# Patient Record
Sex: FEMALE | Race: WHITE | Employment: OTHER | URBAN - METROPOLITAN AREA
[De-identification: names, ages, dates, MRNs, and addresses within clinical notes are randomized per-mention and may not be internally consistent; named-entity substitution may affect disease eponyms.]

---

## 2018-08-08 DIAGNOSIS — O00.80 PREGNANCY, ECTOPIC, CORNUAL OR CERVICAL: Primary | ICD-10-CM

## 2018-08-09 ENCOUNTER — TELEPHONE (OUTPATIENT)
Dept: MATERNAL FETAL MEDICINE | Facility: CLINIC | Age: 38
End: 2018-08-09

## 2018-08-09 ENCOUNTER — PRE VISIT (OUTPATIENT)
Dept: MATERNAL FETAL MEDICINE | Facility: CLINIC | Age: 38
End: 2018-08-09

## 2018-08-09 NOTE — TELEPHONE ENCOUNTER
2nd attempt to reach patient with use of , regarding ultrasound appt tomorrow 8/10/18 at 0930am.  Patient instructed to be NPO, nothing to eat or drink after 3:30am.    Luzma Quan RN

## 2018-08-10 ENCOUNTER — ANESTHESIA EVENT (OUTPATIENT)
Dept: SURGERY | Facility: CLINIC | Age: 38
End: 2018-08-10
Payer: MEDICAID

## 2018-08-10 ENCOUNTER — OFFICE VISIT (OUTPATIENT)
Dept: MATERNAL FETAL MEDICINE | Facility: CLINIC | Age: 38
End: 2018-08-10
Payer: MEDICAID

## 2018-08-10 ENCOUNTER — OFFICE VISIT (OUTPATIENT)
Dept: INTERPRETER SERVICES | Facility: CLINIC | Age: 38
End: 2018-08-10
Payer: MEDICAID

## 2018-08-10 ENCOUNTER — HOSPITAL ENCOUNTER (OUTPATIENT)
Facility: CLINIC | Age: 38
Discharge: HOME | End: 2018-08-10
Attending: OBSTETRICS & GYNECOLOGY | Admitting: OBSTETRICS & GYNECOLOGY
Payer: MEDICAID

## 2018-08-10 ENCOUNTER — HOSPITAL ENCOUNTER (OUTPATIENT)
Dept: ULTRASOUND IMAGING | Facility: CLINIC | Age: 38
End: 2018-08-10
Payer: MEDICAID

## 2018-08-10 ENCOUNTER — ANESTHESIA (OUTPATIENT)
Dept: SURGERY | Facility: CLINIC | Age: 38
End: 2018-08-10
Payer: MEDICAID

## 2018-08-10 DIAGNOSIS — O00.80 OTHER ECTOPIC PREGNANCY WITHOUT INTRAUTERINE PREGNANCY: Primary | ICD-10-CM

## 2018-08-10 DIAGNOSIS — O00.81 OTHER ECTOPIC PREGNANCY WITH INTRAUTERINE PREGNANCY: Primary | ICD-10-CM

## 2018-08-10 DIAGNOSIS — O00.80 PREGNANCY, ECTOPIC, CORNUAL OR CERVICAL: ICD-10-CM

## 2018-08-10 PROBLEM — O00.90 ECTOPIC PREGNANCY: Status: ACTIVE | Noted: 2018-08-10

## 2018-08-10 LAB
ABO + RH BLD: NORMAL
ABO + RH BLD: NORMAL
BASOPHILS # BLD AUTO: 0 10E9/L (ref 0–0.2)
BASOPHILS NFR BLD AUTO: 0.1 %
BLD GP AB SCN SERPL QL: NORMAL
BLOOD BANK CMNT PATIENT-IMP: NORMAL
DIFFERENTIAL METHOD BLD: ABNORMAL
EOSINOPHIL # BLD AUTO: 0.1 10E9/L (ref 0–0.7)
EOSINOPHIL NFR BLD AUTO: 1 %
ERYTHROCYTE [DISTWIDTH] IN BLOOD BY AUTOMATED COUNT: 23.9 % (ref 10–15)
GLUCOSE BLDC GLUCOMTR-MCNC: 93 MG/DL (ref 70–99)
HCT VFR BLD AUTO: 36.7 % (ref 35–47)
HGB BLD-MCNC: 11.3 G/DL (ref 11.7–15.7)
IMM GRANULOCYTES # BLD: 0 10E9/L (ref 0–0.4)
IMM GRANULOCYTES NFR BLD: 0.1 %
LYMPHOCYTES # BLD AUTO: 2 10E9/L (ref 0.8–5.3)
LYMPHOCYTES NFR BLD AUTO: 30.3 %
MCH RBC QN AUTO: 22.6 PG (ref 26.5–33)
MCHC RBC AUTO-ENTMCNC: 30.8 G/DL (ref 31.5–36.5)
MCV RBC AUTO: 73 FL (ref 78–100)
MONOCYTES # BLD AUTO: 0.3 10E9/L (ref 0–1.3)
MONOCYTES NFR BLD AUTO: 4.3 %
NEUTROPHILS # BLD AUTO: 4.3 10E9/L (ref 1.6–8.3)
NEUTROPHILS NFR BLD AUTO: 64.2 %
NRBC # BLD AUTO: 0 10*3/UL
NRBC BLD AUTO-RTO: 0 /100
PLATELET # BLD AUTO: 207 10E9/L (ref 150–450)
RBC # BLD AUTO: 5.01 10E12/L (ref 3.8–5.2)
SPECIMEN EXP DATE BLD: NORMAL
WBC # BLD AUTO: 6.7 10E9/L (ref 4–11)

## 2018-08-10 PROCEDURE — 40000883 ZZH CANCELLED SURGERY UP TO 61-90 MINS: Performed by: OBSTETRICS & GYNECOLOGY

## 2018-08-10 PROCEDURE — 86850 RBC ANTIBODY SCREEN: CPT | Performed by: OBSTETRICS & GYNECOLOGY

## 2018-08-10 PROCEDURE — 90715 TDAP VACCINE 7 YRS/> IM: CPT | Mod: ZF

## 2018-08-10 PROCEDURE — 25000128 H RX IP 250 OP 636: Mod: ZF

## 2018-08-10 PROCEDURE — 85025 COMPLETE CBC W/AUTO DIFF WBC: CPT | Performed by: OBSTETRICS & GYNECOLOGY

## 2018-08-10 PROCEDURE — 86900 BLOOD TYPING SEROLOGIC ABO: CPT | Performed by: OBSTETRICS & GYNECOLOGY

## 2018-08-10 PROCEDURE — 36415 COLL VENOUS BLD VENIPUNCTURE: CPT | Performed by: OBSTETRICS & GYNECOLOGY

## 2018-08-10 PROCEDURE — 86901 BLOOD TYPING SEROLOGIC RH(D): CPT | Performed by: OBSTETRICS & GYNECOLOGY

## 2018-08-10 PROCEDURE — 76801 OB US < 14 WKS SINGLE FETUS: CPT

## 2018-08-10 PROCEDURE — 40000068 ZZH STATISTIC EVAL-PTS ADMITTED TO OTHER DEPTS: Mod: ZF

## 2018-08-10 PROCEDURE — T1013 SIGN LANG/ORAL INTERPRETER: HCPCS | Mod: U3,ZF

## 2018-08-10 PROCEDURE — 82962 GLUCOSE BLOOD TEST: CPT

## 2018-08-10 RX ORDER — PNV NO.95/FERROUS FUM/FOLIC AC 28MG-0.8MG
325 TABLET ORAL
COMMUNITY
Start: 2018-07-19 | End: 2018-09-14

## 2018-08-10 RX ORDER — SODIUM CHLORIDE, SODIUM LACTATE, POTASSIUM CHLORIDE, CALCIUM CHLORIDE 600; 310; 30; 20 MG/100ML; MG/100ML; MG/100ML; MG/100ML
INJECTION, SOLUTION INTRAVENOUS CONTINUOUS
Status: DISCONTINUED | OUTPATIENT
Start: 2018-08-10 | End: 2018-08-10 | Stop reason: HOSPADM

## 2018-08-10 RX ORDER — ONDANSETRON 2 MG/ML
4 INJECTION INTRAMUSCULAR; INTRAVENOUS EVERY 30 MIN PRN
Status: CANCELLED | OUTPATIENT
Start: 2018-08-10

## 2018-08-10 RX ORDER — SODIUM CHLORIDE, SODIUM LACTATE, POTASSIUM CHLORIDE, CALCIUM CHLORIDE 600; 310; 30; 20 MG/100ML; MG/100ML; MG/100ML; MG/100ML
INJECTION, SOLUTION INTRAVENOUS CONTINUOUS
Status: CANCELLED | OUTPATIENT
Start: 2018-08-10

## 2018-08-10 RX ORDER — HYDROMORPHONE HYDROCHLORIDE 1 MG/ML
.3-.5 INJECTION, SOLUTION INTRAMUSCULAR; INTRAVENOUS; SUBCUTANEOUS EVERY 10 MIN PRN
Status: CANCELLED | OUTPATIENT
Start: 2018-08-10

## 2018-08-10 RX ORDER — DIPHENHYDRAMINE HYDROCHLORIDE 25 MG/1
CAPSULE, LIQUID FILLED ORAL
COMMUNITY
Start: 2018-07-19

## 2018-08-10 RX ORDER — ONDANSETRON 4 MG/1
4 TABLET, ORALLY DISINTEGRATING ORAL EVERY 30 MIN PRN
Status: CANCELLED | OUTPATIENT
Start: 2018-08-10

## 2018-08-10 RX ORDER — FENTANYL CITRATE 50 UG/ML
25-50 INJECTION, SOLUTION INTRAMUSCULAR; INTRAVENOUS
Status: CANCELLED | OUTPATIENT
Start: 2018-08-10

## 2018-08-10 RX ORDER — METFORMIN HCL 500 MG
1000 TABLET, EXTENDED RELEASE 24 HR ORAL
COMMUNITY
Start: 2018-01-30

## 2018-08-10 RX ORDER — MEPERIDINE HYDROCHLORIDE 50 MG/ML
12.5 INJECTION INTRAMUSCULAR; INTRAVENOUS; SUBCUTANEOUS
Status: CANCELLED | OUTPATIENT
Start: 2018-08-10

## 2018-08-10 RX ORDER — NALOXONE HYDROCHLORIDE 0.4 MG/ML
.1-.4 INJECTION, SOLUTION INTRAMUSCULAR; INTRAVENOUS; SUBCUTANEOUS
Status: CANCELLED | OUTPATIENT
Start: 2018-08-10 | End: 2018-08-11

## 2018-08-10 RX ORDER — LIDOCAINE 40 MG/G
CREAM TOPICAL
Status: DISCONTINUED | OUTPATIENT
Start: 2018-08-10 | End: 2018-08-10 | Stop reason: HOSPADM

## 2018-08-10 NOTE — NURSING NOTE
Pt at [facility] today for ultrasound due to suspected c/s scar ectopic pregnancy.  Tv ultrasound performed.  Dr. Newell reviewed results with pt and partner and cardioplegia procedure attempted but was not successful.  MD arranged for pt to go to OR for procedure under anesthesia at 1500 today.   See separate MD documentation.  Pt discharged from clinic at 1305 and  will accompany pt to 3rd floor adult pre-op.  Pt is RH positive.

## 2018-08-10 NOTE — PROGRESS NOTES
Bedside transvaginal Ultrasound performed with Dr. Newell.     No embryonic cardiac activity seen with color flow with US.     Due to the lack of cardiac activity on ultrasound. Cardioplegia is no longer need.     Plan:   Trasvaginal injection for cardioplegia cancelled  Patient to be discharged and follow up in [facility] clinic on Monday for methotrexate treatment.   Discussed with patient discharge instructions.     Pedro Yadav MD  [facility] fellow      Physician Attestation   I, Romulo Newell, saw and evaluated Joann Gann with the resident.      I have reviewed and discussed with Dr. Méndez the patient history, physical exam and plan of care. I personally reviewed the vital signs, medications, lab results and imaging.    Key history or physical exam findings: no FCA on ultrasound    Key management decisions made: cancelled procedure under sedation.    Romulo Newell  Date of Service (when I saw the patient): August 10, 2018  Time Spent on this Encounter   I, Romulo Newell, spent a total of 10 minutes in face to face consultation today managing the care of Joann Gann.  Over 50% of my time on the unit was spent counseling the patient and /or coordinating care regarding medical management to follow. See note for details.

## 2018-08-10 NOTE — IP AVS SNAPSHOT
MRN:[id number]                      After Visit Summary   8/10/2018    Joann Gnan    MRN: [id number]           Thank you!     Thank you for choosing [facility] for your care. Our goal is always to provide you with excellent care. Hearing back from our patients is one way we can continue to improve our services. Please take a few minutes to complete the written survey that you may receive in the mail after you visit with us. Thank you!        Patient Information     Date Of Birth          1980        About your hospital stay     You were admitted on:  August 10, 2018 You last received care in the:  [facility] PREOP/PH[facility] II    You were discharged on:  August 10, 2018        Reason for your hospital stay       Admitted for possible transvaginal injection for cardioplegia                  Who to Call     For medical emergencies, please call [phone].  For non-urgent questions about your medical care, please call your primary care provider or clinic, [phone]  For questions related to your surgery, please call your surgery clinic        Attending Provider     Provider Specialty    Romulo Newell MD Obstetrics & Gynecology, Maternal & Fetal Medicine       Primary Care Provider Office Phone # Fax #    [facility] [phone]       When to contact your care team       Notify if you develop severe pain, heavy vaginal bleeding, fever, chills, intractable nausea or vomiting                  After Care Instructions     Activity       Your activity upon discharge: Normal activity. Nothing in the vagina.            Diet       Follow this diet upon discharge:General regular                  Follow-up Appointments     Adult [facility] Follow-up and recommended labs and tests       Office will call for appointment on Monday     Appointments on [place] and/or [facility] (with [facility] or [facility] provider or service). Call [phone] if you haven't heard regarding these  "appointments by Monday                  Further instructions from your care team       Call if bleeding more than pad an hour cramping   Dr Romulo Newell office  [phone]          Pending Results     Date and Time Order Name Status Description    8/10/2018 1441 CBC with platelets differential In process             Admission Information     Date & Time Provider Department Dept. Phone    8/10/2018 Romulo Newell MD [facility] PREOP/PHASE [phone]      Your Vitals Were     Last Period                   2018 (Approximate)           MyChart Information     [facility] lets you send messages to your doctor, view your test results, renew your prescriptions, schedule appointments and more. To sign up, go to [email or web address] . Click on \"Log in\" on the left side of the screen, which will take you to the Welcome page. Then click on \"Sign up Now\" on the right side of the page.     You will be asked to enter the access code listed below, as well as some personal information. Please follow the directions to create your username and password.     Your access code is: [email or web address]  Expires: 2018  4:18 PM     Your access code will  in 90 days. If you need help or a new code, please call your [place] clinic or [phone].        Care EveryWhere ID     This is your Care EveryWhere ID. This could be used by other organizations to access your [place] medical records  [id number]        Equal Access to Services     [facility] : Hadii dania joseph hadeuniceo Soismaali, waaxda luqadaha, qaybta kaalmada adeegyada, hayden bailey . So [facility] [phone].    ATENCIÓN: Si habla español, tiene a taylor disposición servicios gratuitos de asistencia lingüística. Llame al [phone].    We comply with applicable federal civil rights laws and [place] laws. We do not discriminate on the basis of race, color, national origin, age, disability, sex, sexual orientation, or gender identity.             "   Review of your medicines      CONTINUE these medicines which have NOT CHANGED        Dose / Directions    Blood Glucose Monitor System w/Device Kit        Patient not on insulin. Frequency of testin/day. . Dispense test strips and lancets per pharmacist discretion.   Refills:  0       INSULIN ADMIN SUPPLIES        Please dispense a glucose meter:  Per pharmacist discretion   Refills:  0       iron 325 (65 Fe) MG tablet        Dose:  325 mg   Take 325 mg by mouth   Refills:  0       metFORMIN 500 MG 24 hr tablet   Commonly known as:  GLUCOPHAGE-XR        Dose:  1000 mg   Take 1,000 mg by mouth   Refills:  0         STOP taking     Prenatal Vitamins 0.8 MG Tabs                    Protect others around you: Learn how to safely use, store and throw away your medicines at www.disposemymeds.org.             Medication List: This is a list of all your medications and when to take them. Check marks below indicate your daily home schedule. Keep this list as a reference.      Medications           Morning Afternoon Evening Bedtime As Needed    Blood Glucose Monitor System w/Device Kit   Patient not on insulin. Frequency of testin/day. . Dispense test strips and lancets per pharmacist discretion.                                INSULIN ADMIN SUPPLIES   Please dispense a glucose meter:  Per pharmacist discretion                                iron 325 (65 Fe) MG tablet   Take 325 mg by mouth                                metFORMIN 500 MG 24 hr tablet   Commonly known as:  GLUCOPHAGE-XR   Take 1,000 mg by mouth

## 2018-08-10 NOTE — PROGRESS NOTES
Maternal-Fetal Medicine Consultation    Joann Gann  : 1980  MRN: [id number]    REFERRAL:  Joann Gann is a 37 year old sent by Dr. Saucedo for evaluation of c/section scar ectopic.    HPI:  Joann Gann is a 37 year old  at 8w6d by 7w2d US here for M consultation for concerns regarding c/section scar ectopic pregnancy.    She is here with her partner, FOB    She has a history of type 2 diabetes and she is morbidly obese.  She reports that she experienced vaginal bleeding early with this pregnancy.  She had an ultrasound on 2018 where a single gestational sac appeared to be implanted near the cervix.  She was referred for further assessment to [facility] on 2018.  A repeat ultrasound was consistent with a possible  scar pregnancy.  There was a azar intrauterine pregnancy measuring at 7 weeks and 6 days with cardiac activity.  With BRIANNE is on 2019.      An MRI was performed on 2018 and it confirmed a single intrauterine pregnancy implanted in the lower uterine segment on the  scar.    She denies any current vaginal bleeding, cramping, abnormal vaginal discharge.      Dating:    LMP: 2018   Dating ultrasound: 2018     Assigned EDC: 3/16/19  by 7W2D     Obstetrics History:  Patient has a history of 3 prior  sections.      Last  was on 2012 review of her operative report there was note of dense abdominal adhesions with dense fascial scarring.  Normal-appearing ovaries fallopian tubes and  uterus.      Past Medical History:  -Type 2 diabetes  -Morbid obesity BMI of 46        Past Surgical History:  3 prior C-sections    Current Medications:  Prenatal vitamins  Prior to Admission medications    Not on File       Allergies:  Review of patient's allergies indicates no known allergies.    Social History:   She has a partner who is a father of this baby.  She denies smoking.   Denies use of alcohol or drugs.      Family History:  Denies history of genetic disorders, preeeclampsia, thromboembolic disease, bleeding disorders, mental retardation      ROS:  10-point ROS negative except as in HPI     PHYSICAL EXAM:  LMP 2018 (Approximate)    Gen: NAD, well appearing  Chest: Non-labored breathing  Abdomen: Soft, obese. NT/ND.     Other Imaging:   ----------------------------------------------------------------------   OBSTETRICS REPORT                         (Signed Final 2018 15:36)  ----------------------------------------------------------------------  PATIENT INFO:   ID #:       [id number]                            :  [birth year] (37 yrs)(F)   Name:       NOEMY HOANG            Visit Date: 2018 08:53  ----------------------------------------------------------------------  PERFORMED BY:   Performed By:   Rosaura Chris          Referred By:    [facility]                                    Center  ----------------------------------------------------------------------    #  Ordered By               Order #        Accession #    Episode #    1  AUGUSTINE MCLEAN            [id number]      [id number]       [id number]   ----------------------------------------------------------------------  SERVICE(S) PROVIDED:    Transvaginal ultrasound   ----------------------------------------------------------------------  INDICATIONS:    Follow up gestational sac location near cervix and    at level of  scar.    BMI 46.53    Advanced maternal age    Type II diabetes   ----------------------------------------------------------------------  OB HISTORY:   :    4         Term:   3        [place]:   0        SAB:   0   Living:       3  ----------------------------------------------------------------------  FETAL EVALUATION:   Num Of Fetuses:     1   Preg. Location:     Intrauterine - lower   Gest. Sac:          Seen   Yolk Sac:            Visualized   Fetal Pole:         Visualized   Fetal Heart         152   Rate(bpm):   Cardiac Activity:   Observed   Presentation:       Too early to determine   Placenta:           Too early to evaluate   Amniotic Fluid   WILMER FV:      Within normal limits   Comment:    Patient placed in trendelenberg. Gestational sac is located               directly over internal cervical os and slightly to the left, adjacent               to  scar.  ----------------------------------------------------------------------  BIOMETRY:   CRL:      14.9  mm     G. Age:  7w 6d                   BRIANNE:    18  ----------------------------------------------------------------------  GESTATIONAL AGE:   LMP:           9w 0d         Date:  18                 BRIANNE:   19   Best:          8w 3d      Det. By:  Early Ultrasound         BRIANNE:   18                                       (18)  ----------------------------------------------------------------------  CERVIX UTERUS ADNEXA:   Cervix   Length:          1.63  cm.   Shortened Closed   Uterus   Within Normal Limits   Left Ovary   Not seen   Right Ovary   Size(cm)    3.46  x   1.78   x  2.13    Vol(ml):  6.9   Within Normal Limits   Cul De Sac:   No fluid seen   Adnexa:       No abnormality identified  ----------------------------------------------------------------------  COMMENTS:   Via , Ultrasound findings were discussed with the   patient.   This is likely a  scar pregnancy. However,   differential includes cervical pregnancy (but appears to be in   lower uterine segment rather than cervix, in addition a   cervical pregnancy also carries a dismal prognosis)  and   ensuing spontaneous  (normal gestational sac   contour, closed cervix and heart beat argue against this).    scar pregnancy carries a significant risk for   bleeding that may lead to exsanguination or hysterectomy   secondary to faulty placental implantation (  adherent   placenta, ie accreta, percreta). Options are to continue the   pregnancy (significant risk of adherent placenta and bleeding   eventually) or terminate the pregnancy. Generally the earlier   the gestation the fewer complications. There is no consensus   on management options which include surgical (eg. local   excision, hysterectomy, D&C ) or medical (eg KCL injection,   methotrexate) or devlin compression of early gestations. She   was quite distraught. We will obtain an MRI for confirmation   and  she was offered a second opinion.  ----------------------------------------------------------------------  IMPRESSION:   There is an intrauterine gestational sac positioned in the   lower uterus proximal to the internal os, a yolk sac and an   embryo with cardiac activity.   Gestational age is 8w 3d determined by Early Ultrasound   (07/30/18)  with BRIANNE of 03/16/2019. Biometry is consistent   with earlier ultrasound examination.   The sac is not irregular (regular contours) and  the most   caudal border is at the level of the internal os.   The cervix is closed and measures 1.5 cm.   There is a normal endometrial stripe.   Abdominal imaging is poor ( BMI 46, history of three   cesareans) but there is a bulge into the bladder noted.   Moderate vascularity surrounding the sac is appreciated.   Disruption of the anterior uterine wall is not appreciated.  ----------------------------------------------------------------------  RECOMMENDATIONS:   MRI imaging ordered.   Patient desires a second opinion.   will follow up in one week..  ----------------------------------------------------------------------                Yasmin Saucedo MD  Electronically Signed Final Report   08/08/2018 15:36  ----------------------------------------------------------------------   Other Result Information   Interface, Radiology-Marc-In - 08/08/2018  3:37 PM CDT  ----------------------------------------------------------------------    OBSTETRICS REPORT                         (Signed Final 2018 15:36)  ----------------------------------------------------------------------  PATIENT INFO:   ID #:       [id number]                            :  [birth year] (37 yrs)(F)   Name:       NOEMY HOANG            Visit Date: 2018 08:53  ----------------------------------------------------------------------  PERFORMED BY:   Performed By:   Rosaura Chris          Referred By:    [facility]  ----------------------------------------------------------------------    #  Ordered By               Order #        Accession #    Episode #    1  AUGUSTINE MCLEAN            [id number]      [id number]       [id number]   ----------------------------------------------------------------------  SERVICE(S) PROVIDED:    Transvaginal ultrasound   ----------------------------------------------------------------------  INDICATIONS:    Follow up gestational sac location near cervix and    at level of  scar.    BMI 46.53    Advanced maternal age    Type II diabetes   ----------------------------------------------------------------------  OB HISTORY:   :    4         Term:   3        [place]:   0        SAB:   0   Living:       3  ----------------------------------------------------------------------  FETAL EVALUATION:   Num Of Fetuses:     1   Preg. Location:     Intrauterine - lower   Gest. Sac:          Seen   Yolk Sac:           Visualized   Fetal Pole:         Visualized   Fetal Heart         152   Rate(bpm):   Cardiac Activity:   Observed   Presentation:       Too early to determine   Placenta:           Too early to evaluate   Amniotic Fluid   WILMER FV:      Within normal limits   Comment:    Patient placed in trendelenberg. Gestational sac is located               directly over internal cervical os and slightly to the left, adjacent               to   scar.  ----------------------------------------------------------------------  BIOMETRY:   CRL:      14.9  mm     G. Age:  7w 6d                   BRIANNE:    18  ----------------------------------------------------------------------  GESTATIONAL AGE:   LMP:           9w 0d         Date:  18                 BRIANNE:   18   Best:          8w 3d      Det. By:  Early Ultrasound         BRIANNE:   18                                       (18)  ----------------------------------------------------------------------  CERVIX UTERUS ADNEXA:   Cervix   Length:          1.63  cm.   Shortened Closed   Uterus   Within Normal Limits   Left Ovary   Not seen   Right Ovary   Size(cm)    3.46  x   1.78   x  2.13    Vol(ml):  6.9   Within Normal Limits   Cul De Sac:   No fluid seen   Adnexa:       No abnormality identified  ----------------------------------------------------------------------  COMMENTS:   Via , Ultrasound findings were discussed with the   patient.   This is likely a  scar pregnancy. However,   differential includes cervical pregnancy (but appears to be in   lower uterine segment rather than cervix, in addition a   cervical pregnancy also carries a dismal prognosis)  and   ensuing spontaneous  (normal gestational sac   contour, closed cervix and heart beat argue against this).    scar pregnancy carries a significant risk for   bleeding that may lead to exsanguination or hysterectomy   secondary to faulty placental implantation ( adherent   placenta, ie accreta, percreta). Options are to continue the   pregnancy (significant risk of adherent placenta and bleeding   eventually) or terminate the pregnancy. Generally the earlier   the gestation the fewer complications. There is no consensus   on management options which include surgical (eg. local   excision, hysterectomy, D&C ) or medical (eg KCL injection,   methotrexate) or devlin compression of early gestations.  She   was quite distraught. We will obtain an MRI for confirmation   and  she was offered a second opinion.  ----------------------------------------------------------------------  IMPRESSION:   There is an intrauterine gestational sac positioned in the   lower uterus proximal to the internal os, a yolk sac and an   embryo with cardiac activity.   Gestational age is 8w 3d determined by Early Ultrasound   (18)  with BRIANNE of 2019. Biometry is consistent   with earlier ultrasound examination.   The sac is not irregular (regular contours) and  the most   caudal border is at the level of the internal os.   The cervix is closed and measures 1.5 cm.   There is a normal endometrial stripe.   Abdominal imaging is poor ( BMI 46, history of three   cesareans) but there is a bulge into the bladder noted.   Moderate vascularity surrounding the sac is appreciated.   Disruption of the anterior uterine wall is not appreciated.  ----------------------------------------------------------------------  RECOMMENDATIONS:   MRI imaging ordered.   Patient desires a second opinion.   will follow up in one week..  ----------------------------------------------------------------------                Yasmin Saucedo MD       Interface, Radiology-Marc-In - 2018  1:50 PM CDT  Indication: Placental disorders  pregnant in first trimester,  scar pregnancy is suspected, please confirm      Comparison: Pelvic ultrasound dated 2018 and 2018    Technique: Multiplanar multisequence MR imaging the pelvis without IV contrast.    Findings: There is a single intrauterine gestational sac identified that is implanted within the lower uterine segment on the  scar. Sagittal image 19 of series 301 demonstrates thinning of the uterus in this region, measuring between 2 and 3 mm. The cervix does not appear involved.    The remaining visualized pelvic structures are normal in appearance. There is no free fluid in the  abdomen. Osseous elements are unremarkable without evidence of cyst stress reactive change or an infiltrative process.    IMPRESSION  Impression:  Single intrauterine gestation implanted in the lower uterine segment on a  scar that is best seen on sagittal image 19 of series 301, with the uterine wall measuring 2-3 mm in thickness in this region.    Findings discussed with Yasmin Saucedo on 2018 at 1:40 PM.      ASSESSMENT:  37 year old y.o.  at 8w6d by 7 weeks and 2 days ultrasound here for  section scar ectopic.    After transvaginal transabdominal ultrasound evaluation today in our clinic we concur with her most recent ultrasound and MRI that this pregnancy is a  scar ectopic.  It is noted that it is endogenous at this time and does not appear to involve the bladder.  Today on ultrasound there is active cardiac activity.    We discussed with Gabby and her partner that this is a pregnancy that carries significant maternal risk and is considered an ectopic and nonviable.  Patient was informed of management options that included expectant, medical management and surgical treatment.     RECOMMENDATIONS:    We recommended against expectant management since this poses a significant risk of maternal hemorrhage and has significant morbidity.  Surgical management options include hysterectomy, laparoscopic wedge resection and removal of ectopic pregnancy and suction dilation and curettage.  We explained that with her prior surgical history and current BMI and history of type 2 diabetes there is significant morbidity and risk of internal hemorrhage.     We explained that our recommended treatment consists of fetal cardioplegia followed by adjuvant medical treatment with methotrexate.  It was discussed that she will need close follow-up and further sonographic assessment.  We explained that this management can improve her chances of preserving childbearing and reducing the risk if  surgery is required.    She expressed interest in preservation of  uterus for childbearing.  It was explained that patient with her history and current  scar ectopic are at increased risk of recurrent  scar ectopics in the future and she voiced understanding.    Joann and her partner agreed to proceed with fetal cardioplegia here in our clinic.  She was informed about the risks and benefits of the procedure including but not limited to infection, bleeding, damage to internal organs, risk of maternal toxicity with lidocaine, allergic reactions and inability to complete the procedure.    PROCEDURE: Transvaginal injection for cardioplegia    Under transvaginal guidance a 21-gauge 30 cm egg retrieval needle was introduced in the uterine cavity.  The needle was advanced towards the embryo through the anterior portion of the bladder and anterior uterine wall into the uterine cavity.  This was attempted in 3 locations and while the needle was advanced 1% lidocaine was injected to provide some analgesia injecting about 3cc. Intrauterine and intraembryonic placement of the needle was confirmed and 2 cc of lidocaine were injected.  Assessment of the fetal cardiac activity noted persistent fetal heart rate and unsuccessful cardioplegia.  Due to unsuccessful cardioplegia and significant maternal discomfort the procedure was terminated.    There was an estimated blood loss of about 30 ml.     Gabby and her partner were counseled and were given the option to proceed with a repeat attempt under sedation or spinal anesthesia.  They agreed and arrangements were made to proceed with this in the OR at the [facility] hospital.      I acted as a scribe for Dr. Newell.     Pedro Bashir   [facility] Fellow     8/10/2018 12:38 PM

## 2018-08-10 NOTE — ANESTHESIA PREPROCEDURE EVALUATION
Anesthesia Evaluation     . Pt has had prior anesthetic. Type: General and Regional           ROS/MED HX    ENT/Pulmonary:  - neg pulmonary ROS     Neurologic:  - neg neurologic ROS     Cardiovascular:  - neg cardiovascular ROS       METS/Exercise Tolerance:     Hematologic:  - neg hematologic  ROS       Musculoskeletal:         GI/Hepatic:  - neg GI/hepatic ROS       Renal/Genitourinary:  - ROS Renal section negative       Endo:     (+) type II DM Obesity, .      Psychiatric:  - neg psychiatric ROS       Infectious Disease:  - neg infectious disease ROS       Malignancy:      - no malignancy   Other: Comment:  at 8 weeks gestation with suspected S/P C/S scar ectopic pregnancy.  Attempted unsuccessful cardioplegia procedure in office today.                    Physical Exam  Normal systems: cardiovascular, pulmonary and dental    Airway   Mallampati: II  TM distance: >3 FB  Neck ROM: full    Dental     Cardiovascular       Pulmonary                     Anesthesia Plan      History & Physical Review  History and physical reviewed and following examination; no interval change.    ASA Status:  2 .    NPO Status:  > 6 hours    Plan for MAC and Spinal   PONV prophylaxis:  Ondansetron (or other 5HT-3) and Dexamethasone or Solumedrol       Postoperative Care  Postoperative pain management:  IV analgesics.      Consents  Anesthetic plan, risks, benefits and alternatives discussed with:  Patient and Spouse..                          .    Plan  -  MAC vs Spinal      The risks, benefits and possible complications of both MAC anesthesia and Spinal have been discussed in full with both the pt and her  via an .  I am waiting to consult the surgeon for additional information regarding the procedure to assess the anesthesia requirements.  Both the pt and her  voice understanding and wish to proceed.      Dr. Teodora Barbour MD  Anesthesia  08/10/2018 @ 7052.

## 2018-08-10 NOTE — OR NURSING
Patients surgery was cancelled after ultrasound done in pre op pacu by Dr. Landers  Confirmed no heartbeat.  Time spent in preop 2.5 hours.

## 2018-08-10 NOTE — IP AVS SNAPSHOT
UR PREOP/PHASE II    [address] [place] [address]    Phone:  [phone]                                       After Visit Summary   8/10/2018    Joann Gann    MRN: [id number]           After Visit Summary Signature Page     I have received my discharge instructions, and my questions have been answered. I have discussed any challenges I see with this plan with the nurse or doctor.    ..........................................................................................................................................  Patient/Patient Representative Signature      ..........................................................................................................................................  Patient Representative Print Name and Relationship to Patient    ..................................................               ................................................  Date                                            Time    ..........................................................................................................................................  Reviewed by Signature/Title    ...................................................              ..............................................  Date                                                            Time

## 2018-08-10 NOTE — H&P
Maternal-Fetal Medicine   H&P      Joann Gann  : 1980  MRN: [id number]      HPI:  Joann Gann is a 37 year old  at 8w6d by 7w2d US here for a transvaginal injection for cardioplegia for a  c/section scar ectopic pregnancy.     She has a history of type 2 diabetes and she is morbidly obese.  She has been diagnosed with a c/section scar ectopic. Today she presented for M consultation for a second opinion. She had an Ultrasound that confirmed an ectopic c/section scar pregnancy. She was counseled regarding treatment with MTX due to high maternal risk of this condition. An attempt for transvaginal injection for cardioplegia was made in the office but this was unsuccessful as the patient did not tolerated the procedure.     She currently reports some  vaginal bleeding and slight cramping.       Dating:                          LMP: 2018   Dating ultrasound: 2018      Assigned EDC: 3/16/19  by 7W2D US     Obstetrics History:  Patient has a history of 3 prior  sections.       Last  was on 2012 review of her operative report there was note of dense abdominal adhesions with dense fascial scarring.  Normal-appearing ovaries fallopian tubes and  uterus.        Past Medical History:  -Type 2 diabetes  -Morbid obesity BMI of 46           Past Surgical History:  3 prior C-sections     Current Medications:  Prenatal vitamins  Prior to Admission medications    Not on File         Allergies:  Review of patient's allergies indicates no known allergies.     Social History:   She has a partner who is a father of this baby.  She denies smoking.  Denies use of alcohol or drugs.        Family History:  Denies history of genetic disorders, preeeclampsia, thromboembolic disease, bleeding disorders, mental retardation        ROS:  10-point ROS negative except as in HPI      PHYSICAL EXAM:  LMP 2018 (Approximate)     Gen: NAD, well appearing  Chest: Non-labored  breathing  Abdomen: Soft, obese. NT/ND.      Other Imaging:       Interface, Radiology-[facility]-In - 2018  1:50 PM CDT  Indication: Placental disorders  pregnant in first trimester,  scar pregnancy is suspected, please confirm      Comparison: Pelvic ultrasound dated 2018 and 2018    Technique: Multiplanar multisequence MR imaging the pelvis without IV contrast.    Findings: There is a single intrauterine gestational sac identified that is implanted within the lower uterine segment on the  scar. Sagittal image 19 of series 301 demonstrates thinning of the uterus in this region, measuring between 2 and 3 mm. The cervix does not appear involved.    The remaining visualized pelvic structures are normal in appearance. There is no free fluid in the abdomen. Osseous elements are unremarkable without evidence of cyst stress reactive change or an infiltrative process.    IMPRESSION  Impression:  Single intrauterine gestation implanted in the lower uterine segment on a  scar that is best seen on sagittal image 19 of series 301, with the uterine wall measuring 2-3 mm in thickness in this region.    Findings discussed with Yasmin Saucedo on 2018 at 1:40 PM.                                                                                                                       Early Preg  ---------------------------------------------------------------------------------------------------------  Pat. Name:                  NOEMY GREENBERG                                                                                                                                                             Study Date:                                     08/10/2018 9:43am  Pat. NO:                       [id number]                                                                                                                                                                                 Referring   MD:               AMADOR VIGIL  Site:                                       [facility]                                                                                                                                                      Sonographer:               Shannon Apple RDMS  :                                      1980                                                                                                                                                                                   Age:                                                              37  ---------------------------------------------------------------------------------------------------------     INDICATION  ---------------------------------------------------------------------------------------------------------  Possible  scar ectopic.        PREGNANCY  ---------------------------------------------------------------------------------------------------------  Bautista pregnancy. Number of fetuses: 1.        DATING  ---------------------------------------------------------------------------------------------------------                                           Date                                Details                                                                                      Gest. age                      BRIANNE  LMP                                  2018                                                                                                                           9 w + 3 d                       3/12/2019  External assessment          2018                        GA: 7 w + 2 d                                                                             8 w + 6 d                       3/16/2019  U/S                                   8/10/2018                         based upon CRL                                                                        8 w + 4 d                        3/18/2019  Assigned dating                  Dating performed on 08/10/2018, based on the external assessment (on 2018)             8 w + 6 d                       3/16/2019        ASSESSMENT  ---------------------------------------------------------------------------------------------------------  Gestational sac: visualized. Location:  scar pregnancy.  Embryo: visualized.  Cardiac activity: present.  CRL                                    19.9            mm                                        8w 4d                                 [place]  FHR                                    167             bpm                                                                                        RECOMMENDATION  ---------------------------------------------------------------------------------------------------------     Discussed findings with patient. Due to unsuccessful cardioplegia we have offered patient to repeat procedure under sedation. PAtient consents and will be taken to OR for  sedation and cardioplegia.     Thank-you for the opportunity to participate in the care of this patient. If you have questions regarding today's evaluation or if we can be of further service, please contact the  [facility].            IMPRESSION  ---------------------------------------------------------------------------------------------------------     Patient referred for ultrasound assessment of  scar ectopic pregnancy at 8w6d.     Transvaginal ultrasound performed and identified endogenous gestational sac in lower uterine segment with thin layer of myometrium between the edge of the sac and the  bladder wall.     There is evidence of FCA and perigestational sac vascularity.     Three attempts to access the gestational sac and inject the embryo to induce cardioplegia were unsuccessful due to patient discomfort.                ASSESSMENT:  37 year old y.o.  at 8w6d by 7 weeks and 2 days  ultrasound here for  section scar ectopic.     After transvaginal transabdominal ultrasound evaluation today in our clinic we concur with her most recent ultrasound and MRI that this pregnancy is a  scar ectopic.  It is noted that it is endogenous at this time and does not appear to involve the bladder.  Today on ultrasound there is active cardiac activity.     We discussed with Gabby and her partner that this is a pregnancy that carries significant maternal risk and is considered an ectopic and nonviable.  Patient was informed of management options that included expectant, medical management and surgical treatment.      We recommended against expectant management since this poses a significant risk of maternal hemorrhage and has significant morbidity.  Surgical management options include hysterectomy, laparoscopic wedge resection and removal of ectopic pregnancy and suction dilation and curettage.  We explained that with her prior surgical history and current BMI and history of type 2 diabetes there is significant morbidity and risk of maternal  hemorrhage.      We explained that our recommended treatment consists of fetal cardioplegia followed by adjuvant medical treatment with methotrexate.  It was discussed that she will need close follow-up and further sonographic assessment.  We explained that this management can improve her chances of preserving childbearing and reducing the risk if surgery is required.     She expressed interest in preservation of  uterus for childbearing.  It was explained that patient with her history and current  scar ectopic are at increased risk of recurrent  scar ectopics in the future and she voiced understanding.     Joann and her partner agreed to proceed with cardioplegia here in our clinic.  She was informed about the risks and benefits of the procedure including but not limited to infection, bleeding, damage to internal organs, risk of  maternal toxicity with lidocaine, allergic reactions and inability to complete the procedure. An attempt was made for transvaginal injection for cardioplegia but this was unsuccessful  Due to unsuccessful cardioplegia and significant maternal discomfort the procedure was terminated.     Gabby and her partner were counseled and were given the option to proceed with a repeat attempt under sedation or spinal anesthesia.  They agreed and arrangements were made to proceed with this in the OR at the [facility] hospital.     Plan:   Proceed with transvaginal injection for cardioplegia under anesthesia ( Spinal vs MAC)   T&S, CBC, CMP   Repeat US prior to procedure to confirm embryonic cardiac activity     Pedro Bashir MD  MFM Fellow

## 2018-08-10 NOTE — MR AVS SNAPSHOT
"              After Visit Summary   8/10/2018    Joann Gann    MRN: [id number]           Patient Information     Date Of Birth          1980        Visit Information        Provider Department      8/10/2018 10:00 AM Romulo Newell MD [facility] Maternal Fetal Medicine [facility]        Today's Diagnoses     Other ectopic pregnancy without intrauterine pregnancy    -  1       Follow-ups after your visit        Who to contact     If you have questions or need follow up information about today's clinic visit or your schedule please contact [facility] directly at [phone].  Normal or non-critical lab and imaging results will be communicated to you by ROBAUTOhart, letter or phone within 4 business days after the clinic has received the results. If you do not hear from us within 7 days, please contact the clinic through Insight Direct (ServiceCEO)t or phone. If you have a critical or abnormal lab result, we will notify you by phone as soon as possible.  Submit refill requests through Unda or call your pharmacy and they will forward the refill request to us. Please allow 3 business days for your refill to be completed.          Additional Information About Your Visit        MyChart Information     Unda lets you send messages to your doctor, view your test results, renew your prescriptions, schedule appointments and more. To sign up, go to [email or web address] . Click on \"Log in\" on the left side of the screen, which will take you to the Welcome page. Then click on \"Sign up Now\" on the right side of the page.     You will be asked to enter the access code listed below, as well as some personal information. Please follow the directions to create your username and password.     Your access code is: [email or web address]  Expires: 2018  4:18 PM     Your access code will  in 90 days. If you need help or a new code, please call your [place] clinic or [phone].        Care EveryWhere " ID     This is your Care EveryWhere ID. This could be used by other organizations to access your [facility] medical records  [id number]        Your Vitals Were     Last Period                   2018 (Approximate)            Blood Pressure from Last 3 Encounters:   08/10/18 (!) (P) 146/92    Weight from Last 3 Encounters:   No data found for Wt              We Performed the Following     OBTAIN AFFIRMATION OF INFORMED CONSENT        Primary Care Provider Office Phone # Fax #    [facility] [phone]       [address] [place] [address]        Equal Access to Services     [facility]: Hadii aad ku hadasho Soomaali, waaxda luqadaha, qaybta kaalmada adeegyada, waxay idiin hayaan adeeg yury bailey . So [facility] [phone].    ATENCIÓN: Si habla español, tiene a taylor disposición servicios gratuitos de asistencia lingüística. [facility] [phone].    We comply with applicable federal civil rights laws and [place] laws. We do not discriminate on the basis of race, color, national origin, age, disability, sex, sexual orientation, or gender identity.            Thank you!     Thank you for choosing [facility]  for your care. Our goal is always to provide you with excellent care. Hearing back from our patients is one way we can continue to improve our services. Please take a few minutes to complete the written survey that you may receive in the mail after your visit with us. Thank you!             Your Updated Medication List - Protect others around you: Learn how to safely use, store and throw away your medicines at www.disposemymeds.org.          This list is accurate as of 8/10/18  1:25 PM.  Always use your most recent med list.                   Brand Name Dispense Instructions for use Diagnosis    Blood Glucose Monitor System w/Device Kit      Patient not on insulin. Frequency of testin/day. . Dispense test strips and lancets per  pharmacist discretion.        INSULIN ADMIN SUPPLIES      Please dispense a glucose meter:  Per pharmacist discretion        iron 325 (65 Fe) MG tablet      Take 325 mg by mouth        metFORMIN 500 MG 24 hr tablet    GLUCOPHAGE-XR     Take 1,000 mg by mouth

## 2018-08-13 ENCOUNTER — TELEPHONE (OUTPATIENT)
Dept: MATERNAL FETAL MEDICINE | Facility: CLINIC | Age: 38
End: 2018-08-13

## 2018-08-13 NOTE — TELEPHONE ENCOUNTER
Call (with ) to Joann regarding follow up appt tomorrow with [facility] at 1:45 on 3rd floor.  Patient agreed to time and is familiar with location.  Patient having some cramping and bleeding.  Advised patient to go to the ED with severe pain and/or heavy bleeding (1 pad/hr).  Pt verbalized understanding.    Luzma Quan RN

## 2018-08-15 ENCOUNTER — INFUSION THERAPY VISIT (OUTPATIENT)
Dept: INFUSION THERAPY | Facility: CLINIC | Age: 38
End: 2018-08-15
Attending: OBSTETRICS & GYNECOLOGY
Payer: MEDICAID

## 2018-08-15 VITALS — WEIGHT: 290.57 LBS

## 2018-08-15 DIAGNOSIS — O00.80 OTHER ECTOPIC PREGNANCY WITHOUT INTRAUTERINE PREGNANCY: Primary | ICD-10-CM

## 2018-08-15 PROCEDURE — 96401 CHEMO ANTI-NEOPL SQ/IM: CPT

## 2018-08-15 PROCEDURE — 25000128 H RX IP 250 OP 636: Performed by: OBSTETRICS & GYNECOLOGY

## 2018-08-15 RX ORDER — METHOTREXATE 25 MG/ML
1 INJECTION, SOLUTION INTRA-ARTERIAL; INTRAMUSCULAR; INTRATHECAL; INTRAVENOUS EVERY OTHER DAY
Status: CANCELLED | OUTPATIENT
Start: 2018-08-16

## 2018-08-15 RX ORDER — METHOTREXATE 25 MG/ML
130 INJECTION INTRA-ARTERIAL; INTRAMUSCULAR; INTRATHECAL; INTRAVENOUS EVERY OTHER DAY
Status: DISCONTINUED | OUTPATIENT
Start: 2018-08-15 | End: 2018-08-15 | Stop reason: HOSPADM

## 2018-08-15 RX ADMIN — METHOTREXATE 130 MG: 25 INJECTION, SOLUTION INTRA-ARTERIAL; INTRAMUSCULAR; INTRATHECAL; INTRAVENOUS at 15:46

## 2018-08-15 NOTE — PROGRESS NOTES
Pt here w/  ans S.O. For methotrexate injection.  Was given a handout about med and discussed w/ OB.    Med injected not B gluts.  C/O pain w/ injection.  Pt stayed for 30 min post.  Tolerated without incident.  Instructed that if she have excessive bleeding, cramping, etc to go to ED.    Discharge to home.  F/U w/ OB.

## 2018-08-15 NOTE — MR AVS SNAPSHOT
After Visit Summary   8/15/2018    Joann Gann    MRN: [id number]           Patient Information     Date Of Birth          1980        Visit Information        Provider Department      8/15/2018 3:00 PM Miranda Felix; UR CH 03  Services Department        Today's Diagnoses     Other ectopic pregnancy without intrauterine pregnancy    -  1       Follow-ups after your visit        Your next 10 appointments already scheduled     Aug 17, 2018 12:45 PM CDT   Level 1 with UR CH 02   [facility] Infusion [facility] ([facility])    [address] [place] [address] [address].  [address]  [facility] [address]   [phone]            Aug 20, 2018  8:45 AM CDT   MFM US OB TRANSVAGINAL with [id number]   [facility])    [address] [place] [address] [address]  [facility] [address] [phone]           Wear comfortable clothes and leave your valuables at home.            Aug 20, 2018  9:15 AM CDT   [facility]   [facility]    [address] [place] [address]  [facility] [address] [phone]           Please arrive at the time given for your first appointment. This visit is used internally to schedule the physician's time during your ultrasound.            Aug 21, 2018  3:00 PM CDT   Level 1 with UR CH 01   [facility], Infusion Services ([facility])    [address] [place] [address] [address].  [address]  [facility] [address]   [phone]            Aug 23, 2018  3:00 PM CDT   Level 1 with UR CH 01   [facility]fusion [facility] ([facility])    [address] [place] [address] [address].  [address]  [facility] [address]   [phone]              Who to contact     If you have questions or need follow up information about today's clinic visit or your schedule  "please contact [facility], INFUSION SERVICES directly at [phone].  Normal or non-critical lab and imaging results will be communicated to you by MyChart, letter or phone within 4 business days after the clinic has received the results. If you do not hear from us within 7 days, please contact the clinic through YingYanghart or phone. If you have a critical or abnormal lab result, we will notify you by phone as soon as possible.  Submit refill requests through TRIXandTRAX or call your pharmacy and they will forward the refill request to us. Please allow 3 business days for your refill to be completed.          Additional Information About Your Visit        YingYangharmyDocket Information     TRIXandTRAX lets you send messages to your doctor, view your test results, renew your prescriptions, schedule appointments and more. To sign up, go to [email or web address] . Click on \"Log in\" on the left side of the screen, which will take you to the Welcome page. Then click on \"Sign up Now\" on the right side of the page.     You will be asked to enter the access code listed below, as well as some personal information. Please follow the directions to create your username and password.     Your access code is: [email or web address]  Expires: 2018  4:18 PM     Your access code will  in 90 days. If you need help or a new code, please call your [place] clinic or [phone].        Care EveryWhere ID     This is your Care EveryWhere ID. This could be used by other organizations to access your [place] medical records  [id number]        Your Vitals Were     Last Period                   2018 (Approximate)            Blood Pressure from Last 3 Encounters:   18 120/84   08/10/18 (!) (P) 146/92    Weight from Last 3 Encounters:   08/15/18 131.8 kg (290 lb 9.1 oz)   18 131.8 kg (290 lb 9.6 oz)              Today, you had the following     No orders found for display       Primary Care Provider Office Phone # Fax #    [facility] [phone]       [address] [place] [address]        Equal Access to Services     MACRINA TADEO : Hadii aad ku hadeunicehawk Hadley, xenada robinsondori, andrew kabhargavda gina, hayden olguin sarikanatalie cotton laStanfordferoz echeverriaClemente So [facility] [phone].    ATENCIÓN: Si habla español, tiene a taylor disposición servicios gratuitos de asistencia lingüística. Llame al [phone].    We comply with applicable federal civil rights laws and [place] laws. We do not discriminate on the basis of race, color, national origin, age, disability, sex, sexual orientation, or gender identity.            Thank you!     Thank you for choosing [facility]  for your care. Our goal is always to provide you with excellent care. Hearing back from our patients is one way we can continue to improve our services. Please take a few minutes to complete the written survey that you may receive in the mail after your visit with us. Thank you!             Your Updated Medication List - Protect others around you: Learn how to safely use, store and throw away your medicines at www.disposemymeds.org.          This list is accurate as of 8/15/18  4:17 PM.  Always use your most recent med list.                   Brand Name Dispense Instructions for use Diagnosis    Blood Glucose Monitor System w/Device Kit      Patient not on insulin. Frequency of testin/day. . Dispense test strips and lancets per pharmacist discretion.        INSULIN ADMIN SUPPLIES      Please dispense a glucose meter:  Per pharmacist discretion        iron 325 (65 Fe) MG tablet      Take 325 mg by mouth        leucovorin 5 MG tablet    WELLCOVORIN    5 tablet    Take 3 tablets (15 mg) by mouth every other day    Ectopic pregnancy without intrauterine pregnancy, unspecified location       metFORMIN 500 MG 24 hr tablet    GLUCOPHAGE-XR     Take 1,000 mg by mouth

## 2018-08-17 ENCOUNTER — TELEPHONE (OUTPATIENT)
Dept: OBGYN | Facility: CLINIC | Age: 38
End: 2018-08-17

## 2018-08-17 ENCOUNTER — INFUSION THERAPY VISIT (OUTPATIENT)
Dept: INFUSION THERAPY | Facility: CLINIC | Age: 38
End: 2018-08-17
Attending: OBSTETRICS & GYNECOLOGY
Payer: MEDICAID

## 2018-08-17 VITALS — HEART RATE: 87 BPM | DIASTOLIC BLOOD PRESSURE: 73 MMHG | SYSTOLIC BLOOD PRESSURE: 129 MMHG | TEMPERATURE: 98.8 F

## 2018-08-17 DIAGNOSIS — Z87.59 S/P ECTOPIC PREGNANCY: Primary | ICD-10-CM

## 2018-08-17 DIAGNOSIS — O00.80 OTHER ECTOPIC PREGNANCY WITHOUT INTRAUTERINE PREGNANCY: Primary | ICD-10-CM

## 2018-08-17 LAB
ALT SERPL W P-5'-P-CCNC: 33 U/L (ref 0–50)
AST SERPL W P-5'-P-CCNC: 29 U/L (ref 0–45)
B-HCG SERPL-ACNC: ABNORMAL IU/L (ref 0–5)
CREAT SERPL-MCNC: 0.6 MG/DL (ref 0.52–1.04)
ERYTHROCYTE [DISTWIDTH] IN BLOOD BY AUTOMATED COUNT: 23.7 % (ref 10–15)
GFR SERPL CREATININE-BSD FRML MDRD: >90 ML/MIN/1.7M2
HCT VFR BLD AUTO: 37.1 % (ref 35–47)
HGB BLD-MCNC: 11.6 G/DL (ref 11.7–15.7)
MCH RBC QN AUTO: 23.2 PG (ref 26.5–33)
MCHC RBC AUTO-ENTMCNC: 31.3 G/DL (ref 31.5–36.5)
MCV RBC AUTO: 74 FL (ref 78–100)
PLATELET # BLD AUTO: 180 10E9/L (ref 150–450)
RBC # BLD AUTO: 4.99 10E12/L (ref 3.8–5.2)
WBC # BLD AUTO: 6.6 10E9/L (ref 4–11)

## 2018-08-17 PROCEDURE — 82565 ASSAY OF CREATININE: CPT | Performed by: OBSTETRICS & GYNECOLOGY

## 2018-08-17 PROCEDURE — 84450 TRANSFERASE (AST) (SGOT): CPT | Performed by: OBSTETRICS & GYNECOLOGY

## 2018-08-17 PROCEDURE — 84702 CHORIONIC GONADOTROPIN TEST: CPT | Performed by: OBSTETRICS & GYNECOLOGY

## 2018-08-17 PROCEDURE — 84460 ALANINE AMINO (ALT) (SGPT): CPT | Performed by: OBSTETRICS & GYNECOLOGY

## 2018-08-17 PROCEDURE — T1013 SIGN LANG/ORAL INTERPRETER: HCPCS | Mod: U3

## 2018-08-17 PROCEDURE — 85027 COMPLETE CBC AUTOMATED: CPT | Performed by: OBSTETRICS & GYNECOLOGY

## 2018-08-17 PROCEDURE — 96401 CHEMO ANTI-NEOPL SQ/IM: CPT

## 2018-08-17 PROCEDURE — 25000128 H RX IP 250 OP 636: Mod: JW | Performed by: OBSTETRICS & GYNECOLOGY

## 2018-08-17 RX ORDER — METHOTREXATE 25 MG/ML
130 INJECTION, SOLUTION INTRA-ARTERIAL; INTRAMUSCULAR; INTRATHECAL; INTRAVENOUS EVERY OTHER DAY
Status: DISCONTINUED | OUTPATIENT
Start: 2018-08-18 | End: 2018-08-17 | Stop reason: HOSPADM

## 2018-08-17 RX ORDER — METHOTREXATE 25 MG/ML
1 INJECTION, SOLUTION INTRA-ARTERIAL; INTRAMUSCULAR; INTRATHECAL; INTRAVENOUS EVERY OTHER DAY
Status: CANCELLED | OUTPATIENT
Start: 2018-08-18

## 2018-08-17 RX ADMIN — METHOTREXATE 130 MG: 25 INJECTION, SOLUTION INTRA-ARTERIAL; INTRAMUSCULAR; INTRATHECAL; INTRAVENOUS at 15:03

## 2018-08-17 ASSESSMENT — PAIN SCALES - GENERAL: PAINLEVEL: MILD PAIN (2)

## 2018-08-17 NOTE — NURSING NOTE
Patient arrived ambulatory with her significant other and .  Explained process to patient, labs done first and wait on results prior to injection.  Gave patient handout in Japanese on Methotrexate to Treat an Ectopic Pregnancy.  Patient states that she takes Advil for her bone pain- I asked her to contact her provider for advice on treating her bone pain, literature says not to use an NSAID. Patient stated she would check on it with provider.  Patient states no heavy bleeding or cramps since injection on 8/15/18.    Injections administered bilat gluteal IM- patient c/o pain after injections and rested on bed for approx 10 minutes.  AVS reviewed with patient and SO and they verbalized understanding.  Left in stable condition.  ISIDRA Garner

## 2018-08-17 NOTE — TELEPHONE ENCOUNTER
Spoke with Dr. Yeh concerning Estrellita's methotrexate dose today. It seems that hcg was not drawn on 8/15/18 which was day one of treatment with methotrexate. We do not have numbers to compare today's hcg result with. Per Dr. Yeh, plan is to treat with methotrexate today for 3rd dose. On Sunday patient needs to come to inpatient lab for lab work. MD on call will need to call patient with results to determine if 4th dose of methotrexate is needed on L&D on Sunday 8/19. Dr. Steinberg is on call on Sunday, will route message to her to check patient's hcg level on Sunday and call patient to come to L&D if more methotrexate is needed.

## 2018-08-17 NOTE — MR AVS SNAPSHOT
After Visit Summary   8/17/2018    Joann Gann    MRN: [id number]           Patient Information     Date Of Birth          1980        Visit Information        Provider Department      8/17/2018 12:45 PM [facility]; [facility], Infusion Services        Today's Diagnoses     Other ectopic pregnancy without intrauterine pregnancy    -  1      Care Instructions      Metotrexato para tratar un embarazo ectópico  Un embarazo ectópico ocurre cuando un óvulo fertilizado se implanta fuera del útero (matriz). En la mayoría de casos, se implanta en adan trompa de Falopio. Shahana es un tubo que va desde el útero al ovario. Cuando esto sucede, el embrión no puede crecer normalmente. En algunos casos, puede dejar de crecer rápidamente. O puede crecer hasta que las trompas de Falopio se desgarren (rotura). [place] puede causar sangrado grave y representar un riesgo de muerte para la madre.  El metotrexato es un medicamento que impide que el embrión crezca. El tejido es absorbido por el cuerpo de la madre. Shahana tratamiento puede prevenir la ruptura, sangrado, y el riesgo de muerte para la madre. El metotrexato se utiliza a menudo en lugar de la cirugía para extraer el feto. La cirugía tiene riesgos: por ejemplo, sangrado, infección, cicatrices en las trompas de Falopio, infertilidad y los riesgos de la anestesia.  Realización de un tratamiento con metotrexato  Con frecuencia el metotrexato se administra con adan inyección en el músculo. También se lo puede administrar por vía intravenosa.  Después de la inyección, usted puede tener:    Dolor abdominal leve o calambres    Náuseas y vómitos    Diarrea    Fatiga  Cuidado en el hogar  Adan vez que esté en taylor hogar, puede reanudar norma actividades normales a medida que se sienta sylvia. Tendrá un poco de sangrado y dolor. Mientras se recupera, puede usar acetaminofén para el dolor si así lo indica taylor proveedor de atención médica.  Hasta  que taylor proveedor de atención médica diga que está sylvia:    NO tome medicamentos antiinflamatorios (MAL), jenna la aspirina, el ibuprofeno o el naproxeno    NO ingiera alimentos o vitaminas que contengan ácido fólico o folato (por ejemplo, las vitaminas prenatales tienen folato)    NO yamileth alcohol    NO tome penicilina ni otros determinados antibióticos    NO use tampones o duchas vaginales    No tenga relaciones sexuales.  Molly lo siguiente:    Evite exponerse al sol fausto la primera semana después de la inyección. El sol le puede causar adan erupción fausto elie tiempo.    Utilice un método anticonceptivo fausto al menos 3 meses después del tratamiento.    Hable con un consejero si siente tristeza o dolor después de la pérdida del embarazo.  Seguimiento  Le harán análisis de martell varias veces fausto las semanas después de aplicarse la inyección. [place] es para asegurar que taylor nivel de hormona del embarazo (HCG) está bajando. [place] demuestra que el feto ya no está creciendo. Puede tardar hasta cuatro semanas para que taylor nivel baje a cero. La mayoría de las mujeres necesitan adan isamar inyección. Si los niveles de HCG no son lo suficientemente bajos, taylor proveedor de atención médica puede administrarle adan segunda inyección. En algunos casos, elie tratamiento no funciona y es necesaria la cirugía. Taylor proveedor de atención médica puede darle más información acerca de la cirugía para el embarazo ectópico.      Cuándo debe llamar a taylor proveedor de atención médica?  Llame a taylor proveedor de atención médica si tiene:    Dolor abdominal bajo que empeora o no desaparece    Sangrado vaginal intenso    Náuseas o vómitos que necesitan tratamiento    Mareo, debilidad o desmayo    Fiebre de 100.4  F (38  C) o más    Date Last Reviewed: 6/1/2016 2000-2017 [employer]. [address] [place] [address]. Todos los derechos reservados. Esta información no pretende sustituir la atención médica profesional.  Sólo taylor médico puede diagnosticar y tratar un problema de alin.    On Aug 19th (Sunday) please go to Inpatient lab located on the 3rd floor [facility]. Once lab results are ready the provider will call you and tell you wether or not to go to Labor and Delivery for injection. Stay in the hospital area until you hear from the provider.  Remember to take your Leucovorin as directed by the clinic.              Follow-ups after your visit        Your next 10 appointments already scheduled     Aug 20, 2018  8:45 AM CDT   MFM US OB TRANSVAGINAL with [id number]   [facility] Maternal Fetal Medicine Ultrasound - [facility] ([facility])    [address] [place] [address]  [facility] [id number] [phone]           Wear comfortable clothes and leave your valuables at home.            Aug 20, 2018  9:15 AM CDT   [facility]   [facility] Maternal Fetal Medicine - [facility] ([facility])    [address] [place] [address]  [facility] [id number] [phone]           Please arrive at the time given for your first appointment. This visit is used internally to schedule the physician's time during your ultrasound.            Aug 21, 2018  1:45 PM CDT   Level 2 with UR CH 02   [facility], Infusion Services ([facility])    [address] [place] [address]   [phone]            Aug 23, 2018  2:00 PM CDT   Level 2 with UR CH 01   [facility], Infusion Services ([facility])    [address] [place] [address]  [address]  [facility] [address]   [phone]              Future tests that were ordered for you today     Open Future Orders        Priority Expected Expires Ordered    ALT Routine  8/17/2019 8/17/2018    AST Routine  8/17/2019 8/17/2018    CBC with Platelets Routine  8/17/2019 8/17/2018    Creatinine Routine  8/17/2019 8/17/2018            Who to contact     If you  "have questions or need follow up information about today's clinic visit or your schedule please contact [facility], INFUSION SERVICES directly at [phone].  Normal or non-critical lab and imaging results will be communicated to you by MyChart, letter or phone within 4 business days after the clinic has received the results. If you do not hear from us within 7 days, please contact the clinic through [facility] [facility] or phone. If you have a critical or abnormal lab result, we will notify you by phone as soon as possible.  Submit refill requests through [employer] or call your pharmacy and they will forward the refill request to us. Please allow 3 business days for your refill to be completed.          Additional Information About Your Visit        [email or web address] Information     [employer] lets you send messages to your doctor, view your test results, renew your prescriptions, schedule appointments and more. To sign up, go to [email or web address] . Click on \"Log in\" on the left side of the screen, which will take you to the Welcome page. Then click on \"Sign up Now\" on the right side of the page.     You will be asked to enter the access code listed below, as well as some personal information. Please follow the directions to create your username and password.     Your access code is: [email or web address]  Expires: 2018  4:18 PM     Your access code will  in 90 days. If you need help or a new code, please call your [facility] clinic or [phone].        Care EveryWhere ID     This is your Care EveryWhere ID. This could be used by other organizations to access your [facility] medical records  [id number]        Your Vitals Were     Pulse Temperature Last Period             87 98.8  F (37.1  C) (Oral) 2018 (Approximate)          Blood Pressure from Last 3 Encounters:   18 129/73   18 120/84   08/10/18 (!) (P) 146/92    Weight from Last 3 Encounters:   08/15/18 131.8 kg (290 lb 9.1 oz)   18 131.8 kg (290 lb " 9.6 oz)              We Performed the Following     ALT     AST     CBC with platelets     Creatinine     HCG quantitative pregnancy        Primary Care Provider Office Phone # Fax #    [facility] [phone]       [address] [facility] [address]        Equal Access to Services     MACRINA TADEO : Hadtam joseph boogie Socharline, waaxda luqadaha, qaybta kaalmada adenatalieyada, hayden rosadon sarikanatalie cotton lynn echeverria. So [facility] [phone].    ATENCIÓN: Si habla español, tiene a taylor disposición servicios gratuitos de asistencia lingüística. Llame al [phone].    We comply with applicable federal civil rights laws and [place] laws. We do not discriminate on the basis of race, color, national origin, age, disability, sex, sexual orientation, or gender identity.            Thank you!     Thank you for choosing [facility]  for your care. Our goal is always to provide you with excellent care. Hearing back from our patients is one way we can continue to improve our services. Please take a few minutes to complete the written survey that you may receive in the mail after your visit with us. Thank you!             Your Updated Medication List - Protect others around you: Learn how to safely use, store and throw away your medicines at www.disposemymeds.org.          This list is accurate as of 18  3:11 PM.  Always use your most recent med list.                   Brand Name Dispense Instructions for use Diagnosis    Blood Glucose Monitor System w/Device Kit      Patient not on insulin. Frequency of testin/day. . Dispense test strips and lancets per pharmacist discretion.        INSULIN ADMIN SUPPLIES      Please dispense a glucose meter:  Per pharmacist discretion        iron 325 (65 Fe) MG tablet      Take 325 mg by mouth        leucovorin 5 MG tablet    WELLCOVORIN    5 tablet    Take 3 tablets (15 mg) by mouth every other day    Ectopic pregnancy without  intrauterine pregnancy, unspecified location       metFORMIN 500 MG 24 hr tablet    GLUCOPHAGE-XR     Take 1,000 mg by mouth

## 2018-08-17 NOTE — PATIENT INSTRUCTIONS
Metotrexato para tratar un embarazo ectópico  Un embarazo ectópico ocurre cuando un óvulo fertilizado se implanta fuera del útero (matriz). En la mayoría de casos, se implanta en adan trompa de Falopio. Shahana es un tubo que va desde el útero al ovario. Cuando esto sucede, el embrión no puede crecer normalmente. En algunos casos, puede dejar de crecer rápidamente. O puede crecer hasta que las trompas de Falopio se desgarren (rotura). [employer] puede causar sangrado grave y representar un riesgo de muerte para la madre.  El metotrexato es un medicamento que impide que el embrión crezca. El tejido es absorbido por el cuerpo de la madre. Shahana tratamiento puede prevenir la ruptura, sangrado, y el riesgo de muerte para la madre. El metotrexato se utiliza a menudo en lugar de la cirugía para extraer el feto. La cirugía tiene riesgos: por ejemplo, sangrado, infección, cicatrices en las trompas de Falopio, infertilidad y los riesgos de la anestesia.  Realización de un tratamiento con metotrexato  Con frecuencia el metotrexato se administra con adan inyección en el músculo. También se lo puede administrar por vía intravenosa.  Después de la inyección, usted puede tener:    Dolor abdominal leve o calambres    Náuseas y vómitos    Diarrea    Fatiga  Cuidado en el hogar  Adan vez que esté en taylor hogar, puede reanudar norma actividades normales a medida que se sienta sylvia. Tendrá un poco de sangrado y dolor. Mientras se recupera, puede usar acetaminofén para el dolor si así lo indica taylor proveedor de atención médica.  Hasta que taylor proveedor de atención médica diga que está sylvia:    NO tome medicamentos antiinflamatorios (MAL), jenna la aspirina, el ibuprofeno o el naproxeno    NO ingiera alimentos o vitaminas que contengan ácido fólico o folato (por ejemplo, las vitaminas prenatales tienen folato)    NO yamileth alcohol    NO tome penicilina ni otros determinados antibióticos    NO use tampones o duchas vaginales    No tenga relaciones  sexuales.  Molly lo siguiente:    Evite exponerse al sol fausto la primera semana después de la inyección. El sol le puede causar adan erupción fausto elie tiempo.    Utilice un método anticonceptivo fausto al menos 3 meses después del tratamiento.    Hable con un consejero si siente tristeza o dolor después de la pérdida del embarazo.  Seguimiento  Le harán análisis de martell varias veces fausto las semanas después de aplicarse la inyección. [place] es para asegurar que taylor nivel de hormona del embarazo (HCG) está bajando. [place] demuestra que el feto ya no está creciendo. Puede tardar hasta cuatro semanas para que taylor nivel baje a cero. La mayoría de las mujeres necesitan adan isamar inyección. Si los niveles de HCG no son lo suficientemente bajos, taylor proveedor de atención médica puede administrarle adan segunda inyección. En algunos casos, elie tratamiento no funciona y es necesaria la cirugía. [place] proveedor de atención médica puede darle más información acerca de la cirugía para el embarazo ectópico.      Cuándo debe llamar a taylor proveedor de atención médica?  Llame a taylor proveedor de atención médica si tiene:    Dolor abdominal bajo que empeora o no desaparece    Sangrado vaginal intenso    Náuseas o vómitos que necesitan tratamiento    Mareo, debilidad o desmayo    Fiebre de 100.4  F (38  C) o más    Date Last Reviewed: 6/1/2016 2000-2017 [employer]. [address] [place] [address], [place] [address]. Todos los derechos reservados. Esta información no pretende sustituir la atención médica profesional. Sólo taylor médico puede diagnosticar y tratar un problema de alin.    On Aug 19th (Sunday) please go to Inpatient lab located on the 3rd [facility]. Once lab results are ready the provider will call you and tell you wether or not to go to Labor and Delivery for injection. Stay in the hospital area until you hear from the provider.  Remember to take your Leucovorin as directed by the clinic.

## 2018-08-19 ENCOUNTER — HOSPITAL ENCOUNTER (OUTPATIENT)
Facility: CLINIC | Age: 38
Discharge: HOME | End: 2018-08-19
Attending: OBSTETRICS & GYNECOLOGY | Admitting: OBSTETRICS & GYNECOLOGY
Payer: MEDICAID

## 2018-08-19 VITALS
BODY MASS INDEX: 46.61 KG/M2 | HEART RATE: 77 BPM | RESPIRATION RATE: 20 BRPM | WEIGHT: 290 LBS | HEIGHT: 66 IN | SYSTOLIC BLOOD PRESSURE: 127 MMHG | DIASTOLIC BLOOD PRESSURE: 69 MMHG

## 2018-08-19 DIAGNOSIS — O00.80 OTHER ECTOPIC PREGNANCY WITHOUT INTRAUTERINE PREGNANCY: Primary | ICD-10-CM

## 2018-08-19 DIAGNOSIS — Z87.59 S/P ECTOPIC PREGNANCY: ICD-10-CM

## 2018-08-19 LAB
ALT SERPL W P-5'-P-CCNC: 45 U/L (ref 0–50)
AST SERPL W P-5'-P-CCNC: 43 U/L (ref 0–45)
B-HCG SERPL-ACNC: ABNORMAL IU/L (ref 0–5)
CREAT SERPL-MCNC: 0.62 MG/DL (ref 0.52–1.04)
ERYTHROCYTE [DISTWIDTH] IN BLOOD BY AUTOMATED COUNT: 23.4 % (ref 10–15)
GFR SERPL CREATININE-BSD FRML MDRD: >90 ML/MIN/1.7M2
HCT VFR BLD AUTO: 36.4 % (ref 35–47)
HGB BLD-MCNC: 11.4 G/DL (ref 11.7–15.7)
MCH RBC QN AUTO: 23.3 PG (ref 26.5–33)
MCHC RBC AUTO-ENTMCNC: 31.3 G/DL (ref 31.5–36.5)
MCV RBC AUTO: 74 FL (ref 78–100)
PLATELET # BLD AUTO: 206 10E9/L (ref 150–450)
RBC # BLD AUTO: 4.89 10E12/L (ref 3.8–5.2)
WBC # BLD AUTO: 5.6 10E9/L (ref 4–11)

## 2018-08-19 PROCEDURE — 25000128 H RX IP 250 OP 636: Performed by: STUDENT IN AN ORGANIZED HEALTH CARE EDUCATION/TRAINING PROGRAM

## 2018-08-19 PROCEDURE — 84702 CHORIONIC GONADOTROPIN TEST: CPT | Performed by: OBSTETRICS & GYNECOLOGY

## 2018-08-19 PROCEDURE — 84450 TRANSFERASE (AST) (SGOT): CPT | Performed by: OBSTETRICS & GYNECOLOGY

## 2018-08-19 PROCEDURE — 84460 ALANINE AMINO (ALT) (SGPT): CPT | Performed by: OBSTETRICS & GYNECOLOGY

## 2018-08-19 PROCEDURE — 82565 ASSAY OF CREATININE: CPT | Performed by: OBSTETRICS & GYNECOLOGY

## 2018-08-19 PROCEDURE — 96402 CHEMO HORMON ANTINEOPL SQ/IM: CPT

## 2018-08-19 PROCEDURE — 36415 COLL VENOUS BLD VENIPUNCTURE: CPT | Performed by: OBSTETRICS & GYNECOLOGY

## 2018-08-19 PROCEDURE — 85027 COMPLETE CBC AUTOMATED: CPT | Performed by: OBSTETRICS & GYNECOLOGY

## 2018-08-19 RX ORDER — METHOTREXATE 25 MG/ML
130 INJECTION, SOLUTION INTRA-ARTERIAL; INTRAMUSCULAR; INTRAVENOUS ONCE
Status: COMPLETED | OUTPATIENT
Start: 2018-08-19 | End: 2018-08-19

## 2018-08-19 RX ADMIN — METHOTREXATE 130 MG: 25 INJECTION, SOLUTION INTRA-ARTERIAL; INTRAMUSCULAR; INTRAVENOUS at 17:42

## 2018-08-19 NOTE — PROGRESS NOTES
"[facility]  OB Triage Note    CC: CS scar ectopic pregnancy     HPI: Ms. 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 She is overall doing well today. Has mild abdominal cramping. Has had mild bleeding since she was diagnosed with ectopic pregnancy, it is slightly increased over the last few days, similar to a period. Denies N/V, SOB, chest pain.          Objective:  Patient Vitals for the past 24 hrs:   BP Pulse Resp Height Weight   18 1534 127/69 77 20 1.676 m (5' 6\") 131.5 kg (290 lb)     Gen: Well-appearing, NAD  CV: Regular rate  Pulm: Breathing comfortably on RA  Abd: Soft, gravid, mildly tender.     Labs:  Results for orders placed or performed in visit on 18 (from the past 24 hour(s))   ALT   Result Value Ref Range    ALT 45 0 - 50 U/L   AST   Result Value Ref Range    AST 43 0 - 45 U/L   CBC with Platelets   Result Value Ref Range    WBC 5.6 4.0 - 11.0 10e9/L    RBC Count 4.89 3.8 - 5.2 10e12/L    Hemoglobin 11.4 (L) 11.7 - 15.7 g/dL    Hematocrit 36.4 35.0 - 47.0 %    MCV 74 (L) 78 - 100 fl    MCH 23.3 (L) 26.5 - 33.0 pg    MCHC 31.3 (L) 31.5 - 36.5 g/dL    RDW 23.4 (H) 10.0 - 15.0 %    Platelet Count 206 150 - 450 10e9/L   Creatinine   Result Value Ref Range    Creatinine 0.62 0.52 - 1.04 mg/dL    GFR Estimate >90 >60 mL/min/1.7m2    GFR Estimate If Black >90 >60 mL/min/1.7m2   HCG quantitative pregnancy   Result Value Ref Range    HCG Quantitative Serum 39917 (H) 0 - 5 IU/L       Assesment/Plan:  Ms. Joann Gann is a 37 year old  presenting for management of known CS scar ectopic pregnancy.     bHCG today has not declined by 50%, today it is 8[address]14 compared to [address]7610 on . Symptoms are stable. CBC,Cr, AST/ALT wnl. Plan to repeat methotrexate IM 1mg/kg today (third dose today). Per protocol, patient will take leucovorin tomorrow. She has sufficient dose at home. She will return to lab on  " for repeat bHCG, CBC, Cr. ALT/AST. Clinic to contact patient if repeat methotrexate is required. Follow-up ultrasound will need to be scheduled. Return precautions discussed.     Protocol outlined by Dr. Carter summarized below:   - Plan multidose methotrexate per [facility] protocol:                         - Plan baseline labs on day 1 with Beta HCG, CBC, Cr, AST, ALT                         - Methotrexate 1 mg/kg on days 1, 3, 5, 7, 9                         - Rescue Leucovorin 0.1 mg/kg on days 2, 4, 6, 8, 10                         - Beta HCG to be drawn on Methotrexate dates prior to administration with plan to hold any additional doses if the Beta HCG drops by greater than 50% of day 1 Beta HCG value (this value was not drawn, day 3 bHCG was 44006)      Seen and discussed with Dr. Idris Cardenas MD, S  Ob/Gyn PGY2  08/19/18 5:07 PM    OB/GYN Staff -- Pt seen and examined by me. Agree with note as above. Methotrexate is indicated today.  MD Jamie

## 2018-08-19 NOTE — DISCHARGE INSTRUCTIONS
Discharge Instruction for Undelivered Patients      You were seen for: Methotrexate injection  We Consulted: Dr. Cardenas, Dr. Steinberg  You had (Test or Medicine):labs, methotrexate     Diet:   You may eat meals and snacks.     Activity:  As tolerated     Call your provider if you notice:  Swelling in your face or increased swelling in your hands or legs.  Headaches that are not relieved by Tylenol (acetaminophen).  Changes in your vision (blurring: seeing spots or stars.)  Nausea (sick to your stomach) and vomiting (throwing up).   Signs of bladder infection: pain when you urinate (use the toilet), need to go more often and more urgently.  Abdominal (lower belly) or stomach pain, severe.    Other: Any questions or concerns.    Follow-up:  As scheduled in the clinic

## 2018-08-19 NOTE — PLAN OF CARE
Pt here for methotrexate injection. Labs drawn prior to arrival to the [facility]place. Dr. Cardenas informed of arrival, orders written after lab results back. Pt reports bleeding, needing to change pads every 2 hours at times. Denies pain. Pt given 130mg injection of methotrexate in 2 syringes, one given in each ventrogluteal muscle. Pt here for 30 minutes after injection and no issues. Discharged ambulatory at 1815.

## 2018-08-19 NOTE — IP AVS SNAPSHOT
[facility]    [address]    [place] [address]    Phone:  [phone]                                       After Visit Summary   8/19/2018    Joann Gann    MRN: [id number]           After Visit Summary Signature Page     I have received my discharge instructions, and my questions have been answered. I have discussed any challenges I see with this plan with the nurse or doctor.    ..........................................................................................................................................  Patient/Patient Representative Signature      ..........................................................................................................................................  Patient Representative Print Name and Relationship to Patient    ..................................................               ................................................  Date                                            Time    ..........................................................................................................................................  Reviewed by Signature/Title    ...................................................              ..............................................  Date                                                            Time

## 2018-08-19 NOTE — IP AVS SNAPSHOT
MRN:[id number]                      After Visit Summary   8/19/2018    Joann Gann    MRN: [id number]           Thank you!     Thank you for choosing [facility] for your care. Our goal is always to provide you with excellent care. Hearing back from our patients is one way we can continue to improve our services. Please take a few minutes to complete the written survey that you may receive in the mail after you visit with us. Thank you!        Patient Information     Date Of Birth          1980        Designated Caregiver       Most Recent Value    Caregiver    Will someone help with your care after discharge? no      About your hospital stay     You were admitted on:  August 19, 2018 You last received care in the:  UR 4[facility]    You were discharged on:  August 19, 2018       Who to Call     For medical emergencies, please call 911.  For non-urgent questions about your medical care, please call your primary care provider or clinic, [phone]          Attending Provider     Provider Specialty    Romulo Newell MD Obstetrics & Gynecology, Maternal & Fetal Medicine    Shelia Steinberg MD OB/Gyn       Primary Care Provider Office Phone # Fax #    [facility] [phone]      After Care Instructions     Discharge Instructions       Take Leucovorin as prescribed tomorrow (8/20)  Return to lab on 8/21 to have labs drawn. You will be contacted if you need to come for additional methotrexate on 8/21. Please call the clinic on 8/21 if you have not heard about whether you need more methotrexate.                  Your next 10 appointments already scheduled     Aug 20, 2018  8:45 AM CDT   MFM US OB TRANSVAGINAL with [id number]   [facility] ([facility] [place] [facility])    [address] [place] [id number] [phone]           Wear comfortable clothes and leave your valuables at home.             Aug 20, 2018  9:15 AM CDT   Radiology MD with SH MYA GIL   [facility] ([facility])    [address] [place] [id number] [phone]           Please arrive at the time given for your first appointment. This visit is used internally to schedule the physician's time during your ultrasound.            Aug 21, 2018  1:45 PM CDT   Level 2 with UR CH 02   [facility] Infusion Services ([facility])    [address] [place] [address]  [address] [place] [address]   [phone]            Aug 23, 2018  2:00 PM CDT   Level 2 with UR CH 01   [facility] ([facility])    [address] [place] [address]   [phone]              Further instructions from your care team       Discharge Instruction for Undelivered Patients      You were seen for: Methotrexate injection  We Consulted: Dr. Cardenas, Dr. Steinberg  You had (Test or Medicine):labs, methotrexate     Diet:   You may eat meals and snacks.     Activity:  As tolerated     Call your provider if you notice:  Swelling in your face or increased swelling in your hands or legs.  Headaches that are not relieved by Tylenol (acetaminophen).  Changes in your vision (blurring: seeing spots or stars.)  Nausea (sick to your stomach) and vomiting (throwing up).   Signs of bladder infection: pain when you urinate (use the toilet), need to go more often and more urgently.  Abdominal (lower belly) or stomach pain, severe.    Other: Any questions or concerns.    Follow-up:  As scheduled in the clinic          Pending Results     No orders found from 8/17/2018 to 8/20/2018.            Statement of Approval     Ordered          08/19/18 3618  I have reviewed and agree with all the recommendations and orders detailed in this document.  EFFECTIVE NOW     Approved and electronically signed by:   "Sondra Cardenas MD             Admission Information     Date & Time Provider Department Dept. Phone    2018 Shelia Steinberg MD UR 4C [phone]      Your Vitals Were     Blood Pressure Pulse Respirations Height Weight Last Period    127/69 77 20 1.676 m (5' 6\") 131.5 kg (290 lb) 2018 (Approximate)    BMI (Body Mass Index)                   46.81 kg/m2           MyChart Information     Textura lets you send messages to your doctor, view your test results, renew your prescriptions, schedule appointments and more. To sign up, go to [email or web address] . Click on \"Log in\" on the left side of the screen, which will take you to the Welcome page. Then click on \"Sign up Now\" on the right side of the page.     You will be asked to enter the access code listed below, as well as some personal information. Please follow the directions to create your username and password.     Your access code is: [email or web address]  Expires: 2018  4:18 PM     Your access code will  in 90 days. If you need help or a new code, please call your [place] clinic or [phone].        Care EveryWhere ID     This is your Care EveryWhere ID. This could be used by other organizations to access your [place] medical records  [id number]        Equal Access to Services     [facility] AH: Hadii dania hyman Socharline, waaxda luqadaha, qaybta kaalmada gina, hayden bailey . So [facility] [phone].    ATENCIÓN: Si habla español, tiene a taylor disposición servicios gratuitos de asistencia lingüística. Llame al [phone].    We comply with applicable federal civil rights laws and [place] laws. We do not discriminate on the basis of race, color, national origin, age, disability, sex, sexual orientation, or gender identity.               Review of your medicines      CONTINUE these medicines which have NOT CHANGED        Dose / Directions    Blood Glucose Monitor System w/Device Kit        " Patient not on insulin. Frequency of testin/day. . Dispense test strips and lancets per pharmacist discretion.   Refills:  0       INSULIN ADMIN SUPPLIES        Please dispense a glucose meter:  Per pharmacist discretion   Refills:  0       iron 325 (65 Fe) MG tablet        Dose:  325 mg   Take 325 mg by mouth   Refills:  0       leucovorin 5 MG tablet   Commonly known as:  WELLCOVORIN   Used for:  Ectopic pregnancy without intrauterine pregnancy, unspecified location        Dose:  15 mg   Take 3 tablets (15 mg) by mouth every other day   Quantity:  5 tablet   Refills:  0       metFORMIN 500 MG 24 hr tablet   Commonly known as:  GLUCOPHAGE-XR        Dose:  1000 mg   Take 1,000 mg by mouth   Refills:  0                Protect others around you: Learn how to safely use, store and throw away your medicines at www.disposemymeds.org.             Medication List: This is a list of all your medications and when to take them. Check marks below indicate your daily home schedule. Keep this list as a reference.      Medications           Morning Afternoon Evening Bedtime As Needed    Blood Glucose Monitor System w/Device Kit   Patient not on insulin. Frequency of testin/day. . Dispense test strips and lancets per pharmacist discretion.                                INSULIN ADMIN SUPPLIES   Please dispense a glucose meter:  Per pharmacist discretion                                iron 325 (65 Fe) MG tablet   Take 325 mg by mouth                                leucovorin 5 MG tablet   Commonly known as:  WELLCOVORIN   Take 3 tablets (15 mg) by mouth every other day                                metFORMIN 500 MG 24 hr tablet   Commonly known as:  GLUCOPHAGE-XR   Take 1,000 mg by mouth

## 2018-08-21 ENCOUNTER — INFUSION THERAPY VISIT (OUTPATIENT)
Dept: INFUSION THERAPY | Facility: CLINIC | Age: 38
End: 2018-08-21
Attending: OBSTETRICS & GYNECOLOGY
Payer: MEDICAID

## 2018-08-21 ENCOUNTER — OFFICE VISIT (OUTPATIENT)
Dept: INTERPRETER SERVICES | Facility: CLINIC | Age: 38
End: 2018-08-21
Payer: MEDICAID

## 2018-08-21 ENCOUNTER — TELEPHONE (OUTPATIENT)
Dept: OBGYN | Facility: CLINIC | Age: 38
End: 2018-08-21

## 2018-08-21 VITALS
WEIGHT: 288 LBS | SYSTOLIC BLOOD PRESSURE: 128 MMHG | HEART RATE: 64 BPM | TEMPERATURE: 98.8 F | BODY MASS INDEX: 46.48 KG/M2 | RESPIRATION RATE: 16 BRPM | OXYGEN SATURATION: 96 % | DIASTOLIC BLOOD PRESSURE: 77 MMHG

## 2018-08-21 DIAGNOSIS — O00.80 OTHER ECTOPIC PREGNANCY WITHOUT INTRAUTERINE PREGNANCY: Primary | ICD-10-CM

## 2018-08-21 DIAGNOSIS — O00.90 ECTOPIC PREGNANCY: Primary | ICD-10-CM

## 2018-08-21 LAB
ALT SERPL W P-5'-P-CCNC: 58 U/L (ref 0–50)
AST SERPL W P-5'-P-CCNC: 55 U/L (ref 0–45)
B-HCG SERPL-ACNC: 9323 IU/L (ref 0–5)
ERYTHROCYTE [DISTWIDTH] IN BLOOD BY AUTOMATED COUNT: 22.9 % (ref 10–15)
HCT VFR BLD AUTO: 36.6 % (ref 35–47)
HGB BLD-MCNC: 11.4 G/DL (ref 11.7–15.7)
MCH RBC QN AUTO: 23.2 PG (ref 26.5–33)
MCHC RBC AUTO-ENTMCNC: 31.1 G/DL (ref 31.5–36.5)
MCV RBC AUTO: 75 FL (ref 78–100)
PLATELET # BLD AUTO: 211 10E9/L (ref 150–450)
RBC # BLD AUTO: 4.91 10E12/L (ref 3.8–5.2)
WBC # BLD AUTO: 5.3 10E9/L (ref 4–11)

## 2018-08-21 PROCEDURE — 85027 COMPLETE CBC AUTOMATED: CPT | Performed by: STUDENT IN AN ORGANIZED HEALTH CARE EDUCATION/TRAINING PROGRAM

## 2018-08-21 PROCEDURE — 84460 ALANINE AMINO (ALT) (SGPT): CPT | Performed by: STUDENT IN AN ORGANIZED HEALTH CARE EDUCATION/TRAINING PROGRAM

## 2018-08-21 PROCEDURE — 84702 CHORIONIC GONADOTROPIN TEST: CPT | Performed by: STUDENT IN AN ORGANIZED HEALTH CARE EDUCATION/TRAINING PROGRAM

## 2018-08-21 PROCEDURE — 25000128 H RX IP 250 OP 636: Performed by: OBSTETRICS & GYNECOLOGY

## 2018-08-21 PROCEDURE — 96401 CHEMO ANTI-NEOPL SQ/IM: CPT

## 2018-08-21 PROCEDURE — T1013 SIGN LANG/ORAL INTERPRETER: HCPCS | Mod: U3

## 2018-08-21 PROCEDURE — 84450 TRANSFERASE (AST) (SGOT): CPT | Performed by: STUDENT IN AN ORGANIZED HEALTH CARE EDUCATION/TRAINING PROGRAM

## 2018-08-21 PROCEDURE — 36415 COLL VENOUS BLD VENIPUNCTURE: CPT

## 2018-08-21 RX ORDER — METHOTREXATE 25 MG/ML
130 INJECTION, SOLUTION INTRA-ARTERIAL; INTRAMUSCULAR; INTRATHECAL; INTRAVENOUS EVERY OTHER DAY
Status: COMPLETED | OUTPATIENT
Start: 2018-08-21 | End: 2018-08-21

## 2018-08-21 RX ORDER — METHOTREXATE 25 MG/ML
1 INJECTION, SOLUTION INTRA-ARTERIAL; INTRAMUSCULAR; INTRATHECAL; INTRAVENOUS EVERY OTHER DAY
Status: CANCELLED | OUTPATIENT
Start: 2018-08-22

## 2018-08-21 RX ADMIN — METHOTREXATE 130 MG: 25 INJECTION, SOLUTION INTRA-ARTERIAL; INTRAMUSCULAR; INTRATHECAL; INTRAVENOUS at 16:02

## 2018-08-21 ASSESSMENT — PAIN SCALES - GENERAL: PAINLEVEL: MODERATE PAIN (4)

## 2018-08-21 NOTE — PROGRESS NOTES
Infusion Nursing Note:  Joannhali Gann presents today for labs, possible methotrexate.    Patient seen by provider today: No   present during visit today: Yes, Vietnamese     Note: Patient states she had a 3rd shot on Sunday.  She did experience more bleeding and cramping after that shot.  Also states she was a little dizzy couple days following the injection.    Intravenous Access:  Labs drawn without difficulty.    Treatment Conditions:  Results reviewed, labs MET treatment parameters, ok to proceed with treatment.    Post Infusion Assessment:  Patient tolerated injection without incident.  Dose split in 2 syringes and given in right and left glutes    Discharge Plan:   Reviewed discharge instructions and further appointments with patient and  via interpretor.  Patient discharged in stable condition accompanied by: .  Departure Mode: Ambulatory.  Will return to clinic on Thursday.  Shelia Ortiz RN

## 2018-08-21 NOTE — TELEPHONE ENCOUNTER
Received call from Shelia at [facility] stating pt ALT/AST were elevated but methotrexate has already been given. They are also requesting pt have orders to go to lab instead of having them drawn at infusion center because it is taking 2 hrs to get results.     Spoke with Dr. Greene who advised ALT/AST levels not concerning. OK to have labs done at infusion center. Orders entered.    Shelia at [facility] will alert pt OK to have blood draw at outpatient lab prior to next injection.

## 2018-08-21 NOTE — MR AVS SNAPSHOT
After Visit Summary   8/21/2018    Joann Gann    MRN: [id number]           Patient Information     Date Of Birth          1980        Visit Information        Provider Department      8/21/2018 1:45 PM [place] [facility]; UR CH 02 [facility]        Today's Diagnoses     Other ectopic pregnancy without intrauterine pregnancy    -  1       Follow-ups after your visit        Your next 10 appointments already scheduled     Aug 23, 2018  2:00 PM CDT   Level 2 with UR CH 01   [facility] ([facility])    [address]  [address]  [facility] [id number] [phone]            Aug 29, 2018  3:00 PM CDT   MFM US OB TRANSVAGINAL with [id number]   [facility])    [address]  [facility] [id number] [phone]           Wear comfortable clothes and leave your valuables at home.            Aug 29, 2018  3:30 PM CDT   Radiology MD with UR MYA GIL   [facility]    [address]  [facility] [address] [phone]           Please arrive at the time given for your first appointment. This visit is used internally to schedule the physician's time during your ultrasound.              Future tests that were ordered for you today     Open Standing Orders        Priority Remaining Interval Expires Ordered    HCG quantitative pregnancy STAT 3/3  9/30/2018 8/21/2018    ALT Routine 3/3  9/30/2018 8/21/2018    AST Routine 3/3  9/30/2018 8/21/2018    CBC with Platelets Routine 3/3  9/30/2018 8/21/2018            Who to contact     If you have questions or need follow up information about today's clinic visit or your schedule please contact [facility] directly at [phone].  Normal or  "non-critical lab and imaging results will be communicated to you by MyChart, letter or phone within 4 business days after the clinic has received the results. If you do not hear from us within 7 days, please contact the clinic through Nuikut or phone. If you have a critical or abnormal lab result, we will notify you by phone as soon as possible.  Submit refill requests through Videdressing or call your pharmacy and they will forward the refill request to us. Please allow 3 business days for your refill to be completed.          Additional Information About Your Visit        Videdressing Information     Videdressing lets you send messages to your doctor, view your test results, renew your prescriptions, schedule appointments and more. To sign up, go to [email or web address] . Click on \"Log in\" on the left side of the screen, which will take you to the Welcome page. Then click on \"Sign up Now\" on the right side of the page.     You will be asked to enter the access code listed below, as well as some personal information. Please follow the directions to create your username and password.     Your access code is: [email or web address]  Expires: 2018  4:18 PM     Your access code will  in 90 days. If you need help or a new code, please call your [place] clinic or [phone].        Care EveryWhere ID     This is your Care EveryWhere ID. This could be used by other organizations to access your [place] medical records  [id number]        Your Vitals Were     Pulse Temperature Respirations Last Period Pulse Oximetry BMI (Body Mass Index)    64 98.8  F (37.1  C) 16 2018 (Approximate) 96% 46.48 kg/m2       Blood Pressure from Last 3 Encounters:   18 128/77   18 127/69   18 129/73    Weight from Last 3 Encounters:   18 130.6 kg (288 lb)   18 131.5 kg (290 lb)   08/15/18 131.8 kg (290 lb 9.1 oz)              We Performed the Following     ALT     AST     CBC with platelets     HCG quantitative " pregnancy        Primary Care Provider Office Phone # Fax #    [facility] [phone]        [facility] [address]        Equal Access to Services     MACRINA TADEO : Hadii aad ku hadja Butcher, xenada luqdori, bertata kabhargavda gina, hayden cotton laStanfordferoz echeverria. So [facility] [phone].    ATENCIÓN: Si habla español, tiene a taylor disposición servicios gratuitos de asistencia lingüística. Llame al [phone].    We comply with applicable federal civil rights laws and [place] laws. We do not discriminate on the basis of race, color, national origin, age, disability, sex, sexual orientation, or gender identity.            Thank you!     Thank you for choosing [facility]  for your care. Our goal is always to provide you with excellent care. Hearing back from our patients is one way we can continue to improve our services. Please take a few minutes to complete the written survey that you may receive in the mail after your visit with us. Thank you!             Your Updated Medication List - Protect others around you: Learn how to safely use, store and throw away your medicines at www.disposemymeds.org.          This list is accurate as of 18  4:41 PM.  Always use your most recent med list.                   Brand Name Dispense Instructions for use Diagnosis    Blood Glucose Monitor System w/Device Kit      Patient not on insulin. Frequency of testin/day. . Dispense test strips and lancets per pharmacist discretion.        INSULIN ADMIN SUPPLIES      Please dispense a glucose meter:  Per pharmacist discretion        iron 325 (65 Fe) MG tablet      Take 325 mg by mouth        leucovorin 5 MG tablet    WELLCOVORIN    5 tablet    Take 3 tablets (15 mg) by mouth every other day    Ectopic pregnancy without intrauterine pregnancy, unspecified location       metFORMIN 500 MG 24 hr tablet    GLUCOPHAGE-XR     Take 1,000 mg by mouth

## 2018-08-23 ENCOUNTER — TELEPHONE (OUTPATIENT)
Dept: OBGYN | Facility: CLINIC | Age: 38
End: 2018-08-23

## 2018-08-23 ENCOUNTER — INFUSION THERAPY VISIT (OUTPATIENT)
Dept: INFUSION THERAPY | Facility: CLINIC | Age: 38
End: 2018-08-23
Attending: OBSTETRICS & GYNECOLOGY
Payer: MEDICAID

## 2018-08-23 VITALS
DIASTOLIC BLOOD PRESSURE: 84 MMHG | TEMPERATURE: 99.3 F | HEART RATE: 80 BPM | OXYGEN SATURATION: 100 % | SYSTOLIC BLOOD PRESSURE: 133 MMHG | RESPIRATION RATE: 18 BRPM

## 2018-08-23 DIAGNOSIS — O00.80 OTHER ECTOPIC PREGNANCY WITHOUT INTRAUTERINE PREGNANCY: Primary | ICD-10-CM

## 2018-08-23 DIAGNOSIS — O00.90 ECTOPIC PREGNANCY: ICD-10-CM

## 2018-08-23 LAB
ALT SERPL W P-5'-P-CCNC: 63 U/L (ref 0–50)
AST SERPL W P-5'-P-CCNC: 55 U/L (ref 0–45)
B-HCG SERPL-ACNC: 5850 IU/L (ref 0–5)
ERYTHROCYTE [DISTWIDTH] IN BLOOD BY AUTOMATED COUNT: 22.9 % (ref 10–15)
HCT VFR BLD AUTO: 34.8 % (ref 35–47)
HGB BLD-MCNC: 10.9 G/DL (ref 11.7–15.7)
MCH RBC QN AUTO: 23.3 PG (ref 26.5–33)
MCHC RBC AUTO-ENTMCNC: 31.3 G/DL (ref 31.5–36.5)
MCV RBC AUTO: 74 FL (ref 78–100)
PLATELET # BLD AUTO: 191 10E9/L (ref 150–450)
RBC # BLD AUTO: 4.68 10E12/L (ref 3.8–5.2)
WBC # BLD AUTO: 4.6 10E9/L (ref 4–11)

## 2018-08-23 PROCEDURE — 85027 COMPLETE CBC AUTOMATED: CPT | Performed by: OBSTETRICS & GYNECOLOGY

## 2018-08-23 PROCEDURE — 84702 CHORIONIC GONADOTROPIN TEST: CPT | Performed by: OBSTETRICS & GYNECOLOGY

## 2018-08-23 PROCEDURE — 84460 ALANINE AMINO (ALT) (SGPT): CPT | Performed by: OBSTETRICS & GYNECOLOGY

## 2018-08-23 PROCEDURE — 36415 COLL VENOUS BLD VENIPUNCTURE: CPT

## 2018-08-23 PROCEDURE — 84450 TRANSFERASE (AST) (SGOT): CPT | Performed by: OBSTETRICS & GYNECOLOGY

## 2018-08-23 ASSESSMENT — PAIN SCALES - GENERAL: PAINLEVEL: MILD PAIN (3)

## 2018-08-23 NOTE — PROGRESS NOTES
Infusion Nursing Note:  Joannhali Gann presents today for labs and possible methotrexate.    Patient seen by provider today: No   present during visit today: Yes, Language: Panamanian.     Note: Patient states she continues to have more cramping and bleeding.  She is also experiencing some lower left sided back pain.    Intravenous Access:  Labs drawn without difficulty.    Treatment Conditions:  HCG 5850  Results reviewed, labs did NOT meet treatment parameters: for methotrexate.    Discharge Plan:   Discharge instructions reviewed with: Family.  Patient to take leucovorin tomorrow.  Copy of AVS reviewed with patient and/or family.  Patient will return next week for labs, ultrasound and Md appointment.  Patient discharged in stable condition accompanied by: .  Departure Mode: Ambulatory.    Shelia Ortiz RN

## 2018-08-23 NOTE — MR AVS SNAPSHOT
After Visit Summary   8/23/2018    Joann Gann    MRN: [id number]           Patient Information     Date Of Birth          1980        Visit Information        Provider Department      8/23/2018 2:00 PM Estefani Orozco; UR  01  Services Department        Today's Diagnoses     Ectopic pregnancy          Care Instructions    Take leucovorin tomorrow, even though you did not get the methotrexate injection today.    Prior to Ultrasound 8/29, have labs drawn in outpatient lab located in the [facility].          Follow-ups after your visit        Your next 10 appointments already scheduled     Aug 29, 2018  3:00 PM CDT   MFM US OB TRANSVAGINAL with [id number]   [facility] Maternal Fetal Medicine Ultrasound - [place] ([facility])    [address]  [facility] [id number] [phone]           Wear comfortable clothes and leave your valuables at home.            Aug 29, 2018  3:30 PM CDT   Radiology MD with UR JOSR GIL   [facility] Maternal Fetal Medicine - [facility])    [address]  [facility] [address]   [phone]           Please arrive at the time given for your first appointment. This visit is used internally to schedule the physician's time during your ultrasound.              Future tests that were ordered for you today     Open Future Orders        Priority Expected Expires Ordered    HCG quantitative pregnancy Routine  8/23/2019 8/23/2018            Who to contact     If you have questions or need follow up information about today's clinic visit or your schedule please contact [facility] directly at [phone].  Normal or non-critical lab and imaging results will be communicated to you by MyChart, letter or phone within 4 business days after the clinic has received the results. If you do not hear from us within 7 days, please contact the clinic  "through Enerkem or phone. If you have a critical or abnormal lab result, we will notify you by phone as soon as possible.  Submit refill requests through Enerkem or call your pharmacy and they will forward the refill request to us. Please allow 3 business days for your refill to be completed.          Additional Information About Your Visit        [employer] Information     Enerkem lets you send messages to your doctor, view your test results, renew your prescriptions, schedule appointments and more. To sign up, go to [email or web address] . Click on \"Log in\" on the left side of the screen, which will take you to the Welcome page. Then click on \"Sign up Now\" on the right side of the page.     You will be asked to enter the access code listed below, as well as some personal information. Please follow the directions to create your username and password.     Your access code is: [email or web address]  Expires: 2018  4:18 PM     Your access code will  in 90 days. If you need help or a new code, please call your [place] clinic or [phone].        Care EveryWhere ID     This is your Care EveryWhere ID. This could be used by other organizations to access your [place] medical records  [id number]        Your Vitals Were     Pulse Temperature Respirations Last Period Pulse Oximetry       80 99.3  F (37.4  C) 18 2018 (Approximate) 100%        Blood Pressure from Last 3 Encounters:   18 133/84   18 128/77   18 127/69    Weight from Last 3 Encounters:   18 130.6 kg (288 lb)   18 131.5 kg (290 lb)   08/15/18 131.8 kg (290 lb 9.1 oz)              We Performed the Following     ALT     AST     CBC with Platelets     HCG quantitative pregnancy        Primary Care Provider Office Phone # Fax #    [facility] [phone]       [facility] [address]        Equal Access to Services     MACRINA TADEO AH: teddy Bonner " andrew guzmanfederica sarikahayden cao. So [facility] [phone].    ATENCIÓN: Si souleymane morris, tiene a taylor disposición servicios gratuitos de asistencia lingüística. Llame al [phone].    We comply with applicable federal civil rights laws and [place] laws. We do not discriminate on the basis of race, color, national origin, age, disability, sex, sexual orientation, or gender identity.            Thank you!     Thank you for choosing [facility]  for your care. Our goal is always to provide you with excellent care. Hearing back from our patients is one way we can continue to improve our services. Please take a few minutes to complete the written survey that you may receive in the mail after your visit with us. Thank you!             Your Updated Medication List - Protect others around you: Learn how to safely use, store and throw away your medicines at www.disposemymeds.org.          This list is accurate as of 18  3:06 PM.  Always use your most recent med list.                   Brand Name Dispense Instructions for use Diagnosis    Blood Glucose Monitor System w/Device Kit      Patient not on insulin. Frequency of testin/day. . Dispense test strips and lancets per pharmacist discretion.        INSULIN ADMIN SUPPLIES      Please dispense a glucose meter:  Per pharmacist discretion        iron 325 (65 Fe) MG tablet      Take 325 mg by mouth        leucovorin 5 MG tablet    WELLCOVORIN    5 tablet    Take 3 tablets (15 mg) by mouth every other day    Ectopic pregnancy without intrauterine pregnancy, unspecified location       metFORMIN 500 MG 24 hr tablet    GLUCOPHAGE-XR     Take 1,000 mg by mouth

## 2018-08-23 NOTE — TELEPHONE ENCOUNTER
Patient's lab came back today and have fallen more than 50%. No need for methotrexate per Dr. Sparks. Will take last dose of leucovorin tomorrow. Will have hcg drawn before appt with MFM on Wednesday 8/29. Follow with [facility] after MFM determines if safe to proceed with surgery.

## 2018-08-23 NOTE — PATIENT INSTRUCTIONS
Take leucovorin tomorrow, even though you did not get the methotrexate injection today.    Prior to Ultrasound 8/29, have labs drawn in outpatient lab located in the [facility].

## 2018-08-29 ENCOUNTER — OFFICE VISIT (OUTPATIENT)
Dept: MATERNAL FETAL MEDICINE | Facility: CLINIC | Age: 38
End: 2018-08-29
Attending: OBSTETRICS & GYNECOLOGY
Payer: MEDICAID

## 2018-08-29 ENCOUNTER — HOSPITAL ENCOUNTER (OUTPATIENT)
Dept: ULTRASOUND IMAGING | Facility: CLINIC | Age: 38
Discharge: HOME | End: 2018-08-29
Attending: OBSTETRICS & GYNECOLOGY | Admitting: OBSTETRICS & GYNECOLOGY
Payer: MEDICAID

## 2018-08-29 ENCOUNTER — OFFICE VISIT (OUTPATIENT)
Dept: INTERPRETER SERVICES | Facility: CLINIC | Age: 38
End: 2018-08-29
Payer: MEDICAID

## 2018-08-29 DIAGNOSIS — O00.90 ECTOPIC PREGNANCY: ICD-10-CM

## 2018-08-29 DIAGNOSIS — O00.81 OTHER ECTOPIC PREGNANCY WITH INTRAUTERINE PREGNANCY: Primary | ICD-10-CM

## 2018-08-29 DIAGNOSIS — O00.80 OTHER ECTOPIC PREGNANCY WITHOUT INTRAUTERINE PREGNANCY: ICD-10-CM

## 2018-08-29 LAB
ALT SERPL W P-5'-P-CCNC: 48 U/L (ref 0–50)
AST SERPL W P-5'-P-CCNC: 35 U/L (ref 0–45)
B-HCG SERPL-ACNC: 1708 IU/L (ref 0–5)
ERYTHROCYTE [DISTWIDTH] IN BLOOD BY AUTOMATED COUNT: 22.9 % (ref 10–15)
HCT VFR BLD AUTO: 36.7 % (ref 35–47)
HGB BLD-MCNC: 11.5 G/DL (ref 11.7–15.7)
MCH RBC QN AUTO: 23.6 PG (ref 26.5–33)
MCHC RBC AUTO-ENTMCNC: 31.3 G/DL (ref 31.5–36.5)
MCV RBC AUTO: 75 FL (ref 78–100)
PLATELET # BLD AUTO: 192 10E9/L (ref 150–450)
RBC # BLD AUTO: 4.88 10E12/L (ref 3.8–5.2)
WBC # BLD AUTO: 6.9 10E9/L (ref 4–11)

## 2018-08-29 PROCEDURE — 76815 OB US LIMITED FETUS(S): CPT

## 2018-08-29 PROCEDURE — 85027 COMPLETE CBC AUTOMATED: CPT | Performed by: OBSTETRICS & GYNECOLOGY

## 2018-08-29 PROCEDURE — 84450 TRANSFERASE (AST) (SGOT): CPT | Performed by: OBSTETRICS & GYNECOLOGY

## 2018-08-29 PROCEDURE — 84702 CHORIONIC GONADOTROPIN TEST: CPT | Performed by: OBSTETRICS & GYNECOLOGY

## 2018-08-29 PROCEDURE — 36415 COLL VENOUS BLD VENIPUNCTURE: CPT | Performed by: OBSTETRICS & GYNECOLOGY

## 2018-08-29 PROCEDURE — 76817 TRANSVAGINAL US OBSTETRIC: CPT | Performed by: OBSTETRICS & GYNECOLOGY

## 2018-08-29 PROCEDURE — 84460 ALANINE AMINO (ALT) (SGPT): CPT | Performed by: OBSTETRICS & GYNECOLOGY

## 2018-08-29 PROCEDURE — T1013 SIGN LANG/ORAL INTERPRETER: HCPCS | Mod: U3

## 2018-08-29 NOTE — MR AVS SNAPSHOT
"              After Visit Summary   8/29/2018    Joann Gann    MRN: [id number]           Patient Information     Date Of Birth          1980        Visit Information        Provider Department      8/29/2018 3:30 PM Jerica Tineo,  [facility] Maternal Fetal Medicine [facility]        Today's Diagnoses     Other ectopic pregnancy with intrauterine pregnancy    -  1       Follow-ups after your visit        Your next 10 appointments already scheduled     Aug 30, 2018  8:45 AM CDT   Return Visit with Luzma Cordero MD   [facility] ([facility])    [facility] [address]  [address]  [facility] [id number] [phone]              Future tests that were ordered for you today     Open Future Orders        Priority Expected Expires Ordered    Maternal Fetal US OB Limited Sing/Mult Routine  6/10/2019 8/10/2018            Who to contact     If you have questions or need follow up information about today's clinic visit or your schedule please contact [facility] directly at [phone].  Normal or non-critical lab and imaging results will be communicated to you by InTownhart, letter or phone within 4 business days after the clinic has received the results. If you do not hear from us within 7 days, please contact the clinic through SkillSlatet or phone. If you have a critical or abnormal lab result, we will notify you by phone as soon as possible.  Submit refill requests through IIZI group or call your pharmacy and they will forward the refill request to us. Please allow 3 business days for your refill to be completed.          Additional Information About Your Visit        MyChart Information     IIZI group lets you send messages to your doctor, view your test results, renew your prescriptions, schedule appointments and more. To sign up, go to [email or web address] . Click on \"Log in\" on the left side of the " "screen, which will take you to the Welcome page. Then click on \"Sign up Now\" on the right side of the page.     You will be asked to enter the access code listed below, as well as some personal information. Please follow the directions to create your username and password.     Your access code is: [email or web address]  Expires: 2018  4:18 PM     Your access code will  in 90 days. If you need help or a new code, please call your [facility] clinic or [phone].        Care EveryWhere ID     This is your Care EveryWhere ID. This could be used by other organizations to access your [facility] medical records  [id number]        Your Vitals Were     Last Period                   2018 (Approximate)            Blood Pressure from Last 3 Encounters:   18 133/84   18 128/77   18 127/69    Weight from Last 3 Encounters:   18 130.6 kg (288 lb)   18 131.5 kg (290 lb)   08/15/18 131.8 kg (290 lb 9.1 oz)              Today, you had the following     No orders found for display       Primary Care Provider Office Phone # Fax #    [facility] [phone]       [address] [place] [address]        Equal Access to Services     MACRINA TADEO AH: Hadii dania canoo Socharline, waaxda luqadaha, qaybta kaalmada adeegyada, hayden vera hayferoz bailey . So [facility] [phone].    ATENCIÓN: Si habla español, tiene a taylor disposición servicios gratuitos de asistencia lingüística. Llame al [phone].    We comply with applicable federal civil rights laws and [place] laws. We do not discriminate on the basis of race, color, national origin, age, disability, sex, sexual orientation, or gender identity.            Thank you!     Thank you for choosing [facility]  for your care. Our goal is always to provide you with excellent care. Hearing back from our patients is one way we can continue to improve our services. Please take a " few minutes to complete the written survey that you may receive in the mail after your visit with us. Thank you!             Your Updated Medication List - Protect others around you: Learn how to safely use, store and throw away your medicines at www.disposemymeds.org.          This list is accurate as of 18  5:23 PM.  Always use your most recent med list.                   Brand Name Dispense Instructions for use Diagnosis    Blood Glucose Monitor System w/Device Kit      Patient not on insulin. Frequency of testin/day. . Dispense test strips and lancets per pharmacist discretion.        INSULIN ADMIN SUPPLIES      Please dispense a glucose meter:  Per pharmacist discretion        iron 325 (65 Fe) MG tablet      Take 325 mg by mouth        leucovorin 5 MG tablet    WELLCOVORIN    5 tablet    Take 3 tablets (15 mg) by mouth every other day    Ectopic pregnancy without intrauterine pregnancy, unspecified location       metFORMIN 500 MG 24 hr tablet    GLUCOPHAGE-XR     Take 1,000 mg by mouth

## 2018-08-29 NOTE — PROGRESS NOTES
"Please see \"Imaging\" tab under \"Chart Review\" for details of today's US.    Jerica Tineo, DO      "

## 2018-08-30 ENCOUNTER — TELEPHONE (OUTPATIENT)
Dept: OBGYN | Facility: CLINIC | Age: 38
End: 2018-08-30

## 2018-08-30 ENCOUNTER — OFFICE VISIT (OUTPATIENT)
Dept: OBGYN | Facility: CLINIC | Age: 38
End: 2018-08-30
Attending: OBSTETRICS & GYNECOLOGY
Payer: MEDICAID

## 2018-08-30 ENCOUNTER — ANESTHESIA EVENT (OUTPATIENT)
Dept: SURGERY | Facility: CLINIC | Age: 38
End: 2018-08-30
Payer: MEDICAID

## 2018-08-30 VITALS
WEIGHT: 289.8 LBS | BODY MASS INDEX: 46.57 KG/M2 | SYSTOLIC BLOOD PRESSURE: 116 MMHG | HEIGHT: 66 IN | HEART RATE: 77 BPM | DIASTOLIC BLOOD PRESSURE: 79 MMHG

## 2018-08-30 DIAGNOSIS — O00.90 ECTOPIC PREGNANCY, UNSPECIFIED LOCATION, UNSPECIFIED WHETHER INTRAUTERINE PREGNANCY PRESENT: Primary | ICD-10-CM

## 2018-08-30 PROCEDURE — T1013 SIGN LANG/ORAL INTERPRETER: HCPCS | Mod: U3,ZF

## 2018-08-30 PROCEDURE — G0463 HOSPITAL OUTPT CLINIC VISIT: HCPCS

## 2018-08-30 NOTE — LETTER
2018       RE: Joann Gann  [address]  [place] [address]     Dear Colleague,    Thank you for referring your patient, Joann Gann, to the [facility] at [facility]. Please see a copy of my visit note below.    Gynecology Visit Note  18    Reason for visit: Follow-up  section scar ectopic, surgery planning    HPI: Patient is a 39 yo  with recent diagnosis of  scar ectopic pregnancy.  Patient had previously been counseled on options for management and desired uterine preservation.  She has since gone through therapy with Methotrexate injections and Leucovorin.  She received 4 injections of Methotrexate on 8/15/18, 18, 18 and 18.  She took lecovorin the day after each of these injections.  She has had about one week of bleeding and cramping with passage of only small clots. Her BhCG response is as follows:      Component      Latest Ref Rng & Units 2018   HCG Quantitative Serum      0 - 5 IU/L 49915 (H) 40811 (H) 9323 (H) 5850 (H)     Component      Latest Ref Rng & Units 2018   HCG Quantitative Serum      0 - 5 IU/L 1708 (H)     Patient also had repeat imaging with MFM yesterday (18) with following results:  1) Resolving  scar pregnancy  2) No fetal pole or fetal cardiac activity  3) Tissue/debris seen in small gestational sac  4) Appears to be an intact myometrial layer anterior to gestational sac immediately within internal os (per MFM Contag).  5) No vascularity within the gestational sac.  6) Some vascularity seen in the posterior uterus.  7) Cervix is identified and appears very short (measures 15 mm).    Given findings yesterday, patient was recommended to proceed with surgical management and she presents here to get this scheduled.    Past Medical History:  -Type 2 diabetes  -Morbid obesity BMI of 46       Past  "Surgical History:  3 prior C-sections      Medications:  Blood Glucose Monitoring Suppl (BLOOD GLUCOSE MONITOR SYSTEM) w/Device KIT Patient not on insulin. Frequency of testin/day. . Dispense test strips and lancets per pharmacist discretion.   Ferrous Sulfate (IRON) 325 (65 Fe) MG tablet Take 325 mg by mouth   INSULIN ADMIN SUPPLIES Please dispense a glucose meter:  Per pharmacist discretion   leucovorin (WELLCOVORIN) 5 MG tablet Take 3 tablets (15 mg) by mouth every other day   metFORMIN (GLUCOPHAGE-XR) 500 MG 24 hr tablet Take 1,000 mg by mouth       Allergies:  Review of patient's allergies indicates no known allergies.      Social History:   She has a partner who is a father of this baby.  She denies smoking.  Denies use of alcohol or drugs.      Family History:  Denies history of genetic disorders, preeeclampsia, thromboembolic disease, bleeding disorders, anesthesia complications    ROS: A complete 10 point ROS was conducted and was negative aside from that noted in the HPI    Physical exam:  /79  Pulse 77  Ht 1.676 m (5' 6\")  Wt 131.5 kg (289 lb 12.8 oz)  LMP 2018 (Approximate)  BMI 46.77 kg/m2    General: NAD, well appearing  HEENT: Moisit mucous membranes, no lymphadenopathy, Full ROM of neck  CV: RRR, no m/r/g  Resp: CTAB, no wheezes  Abdomen: Soft, nontender, nondistended  Extremities: Warm and well perfused, no swelling    A/P  Joann is a 38 year old  with a  section scar ectopic pregnancy now s/p methotrexate treatment and decreasing hcg and recent US showing gestational sac w/o cardiac activity who presents to discuss surgical management  1)  section scar ectopic: Patient tearful through our discussion today as has been very nervous about this pregnancy and the potential need for hysterectomy.  Discussed with patient she has responded very well to the methotrexate protocol therapy and that per [facility] now safe to proceed with surgical management. Case " discussed with both Dr. Newell and Dr. Costello. Recommend hysteroscopic D&C for resection of pregnancy tissue. Discussed risks and benefits to the procedure including bleeding, infection, uterine perforation, possible need for balloon if heavy bleeding.  Patient appreciated discussion today and is anxious to proceed with surgery to hopefully be done with this process.  She does understand that she will need weekly HCG to follow to 0.  Surgical request placed today and scheduled for 8/31/18 with Dr. Ferrara at 7:30 AM.     Afua Chang MD  PGY-1  Ob/Gyn    Staff MD Note    I appreciate the note by Dr. Chang.  Any necessary changes have been made by me.  I evaluated the patient with the resident and agree with the assessment and plan.    Again, thank you for allowing me to participate in the care of your patient.      Sincerely,    Luzma Cordero MD

## 2018-08-30 NOTE — PROGRESS NOTES
Gynecology Visit Note  18    Reason for visit: Follow-up  section scar ectopic, surgery planning    HPI: Patient is a 39 yo  with recent diagnosis of  scar ectopic pregnancy.  Patient had previously been counseled on options for management and desired uterine preservation.  She has since gone through therapy with Methotrexate injections and Leucovorin.  She received 4 injections of Methotrexate on 8/15/18, 18, 18 and 18.  She took lecovorin the day after each of these injections.  She has had about one week of bleeding and cramping with passage of only small clots. Her BhCG response is as follows:      Component      Latest Ref Rng & Units 2018   HCG Quantitative Serum      0 - 5 IU/L 64121 (H) 69565 (H) 9323 (H) 5850 (H)     Component      Latest Ref Rng & Units 2018   HCG Quantitative Serum      0 - 5 IU/L 1708 (H)     Patient also had repeat imaging with MFM yesterday (18) with following results:  1) Resolving  scar pregnancy  2) No fetal pole or fetal cardiac activity  3) Tissue/debris seen in small gestational sac  4) Appears to be an intact myometrial layer anterior to gestational sac immediately within internal os (per MFM Contag).  5) No vascularity within the gestational sac.  6) Some vascularity seen in the posterior uterus.  7) Cervix is identified and appears very short (measures 15 mm).    Given findings yesterday, patient was recommended to proceed with surgical management and she presents here to get this scheduled.    Past Medical History:  -Type 2 diabetes  -Morbid obesity BMI of 46       Past Surgical History:  3 prior C-sections      Medications:  Blood Glucose Monitoring Suppl (BLOOD GLUCOSE MONITOR SYSTEM) w/Device KIT Patient not on insulin. Frequency of testin/day. . Dispense test strips and lancets per pharmacist discretion.   Ferrous Sulfate (IRON) 325 (65 Fe) MG tablet Take 325 mg by mouth  "  INSULIN ADMIN SUPPLIES Please dispense a glucose meter:  Per pharmacist discretion   leucovorin (WELLCOVORIN) 5 MG tablet Take 3 tablets (15 mg) by mouth every other day   metFORMIN (GLUCOPHAGE-XR) 500 MG 24 hr tablet Take 1,000 mg by mouth       Allergies:  Review of patient's allergies indicates no known allergies.      Social History:   She has a partner who is a father of this baby.  She denies smoking.  Denies use of alcohol or drugs.      Family History:  Denies history of genetic disorders, preeeclampsia, thromboembolic disease, bleeding disorders, anesthesia complications    ROS: A complete 10 point ROS was conducted and was negative aside from that noted in the HPI    Physical exam:  /79  Pulse 77  Ht 1.676 m (5' 6\")  Wt 131.5 kg (289 lb 12.8 oz)  LMP 2018 (Approximate)  BMI 46.77 kg/m2    General: NAD, well appearing  HEENT: Moisit mucous membranes, no lymphadenopathy, Full ROM of neck  CV: RRR, no m/r/g  Resp: CTAB, no wheezes  Abdomen: Soft, nontender, nondistended  Extremities: Warm and well perfused, no swelling    A/P  Joann is a 38 year old  with a  section scar ectopic pregnancy now s/p methotrexate treatment and decreasing hcg and recent US showing gestational sac w/o cardiac activity who presents to discuss surgical management  1)  section scar ectopic: Patient tearful through our discussion today as has been very nervous about this pregnancy and the potential need for hysterectomy.  Discussed with patient she has responded very well to the methotrexate protocol therapy and that per MFM now safe to proceed with surgical management. Case discussed with both Dr. Newell and Dr. Costello. Recommend hysteroscopic D&C for resection of pregnancy tissue. Discussed risks and benefits to the procedure including bleeding, infection, uterine perforation, possible need for balloon if heavy bleeding.  Patient appreciated discussion today and is anxious to proceed with " surgery to hopefully be done with this process.  She does understand that she will need weekly HCG to follow to 0.  Surgical request placed today and scheduled for 8/31/18 with Dr. Ferrara at 7:30 AM.     Afua Chang MD  PGY-1  Ob/Gyn    Staff MD Note    I appreciate the note by Dr. Chang.  Any necessary changes have been made by me.  I evaluated the patient with the resident and agree with the assessment and plan.    Luzma Cordero MD

## 2018-08-30 NOTE — TELEPHONE ENCOUNTER
Confirmed surgery date(int) time and location 8/31/18 with arrival time at 5:30a.m with nothing to eat eight hours before scheduled surgery time and clear liquids up to two hours before scheduled surgery time.

## 2018-08-30 NOTE — MR AVS SNAPSHOT
After Visit Summary   8/30/2018    Joann Gann    MRN: [id number]           Patient Information     Date Of Birth          1980        Visit Information        Provider Department      8/30/2018 9:00 AM Fiorella Subramanian Sarah Lynn, MD [facility]        Today's Diagnoses     Ectopic pregnancy, unspecified location, unspecified whether intrauterine pregnancy present    -  1       Follow-ups after your visit        Your next 10 appointments already scheduled     Aug 31, 2018   Procedure with Lisa Beal MD   [facility] [place], Same Day Surgery (--)    [address]  [facility] [address]   [phone]            Sep 14, 2018  1:00 PM CDT   Return Visit with Lisa Beal MD   [facility] ([facility])    [facility] [address]  [address]  [address]  [facility] [id number] [phone]              Future tests that were ordered for you today     Open Future Orders        Priority Expected Expires Ordered    Maternal Fetal US OB Limited Sing/Mult Routine  6/10/2019 8/10/2018            Who to contact     Please call your clinic at [phone] to:    Ask questions about your health    Make or cancel appointments    Discuss your medicines    Learn about your test results    Speak to your doctor            Additional Information About Your Visit        MyChart Information     Fabrus is an electronic gateway that provides easy, online access to your medical records. With Fabrus, you can request a clinic appointment, read your test results, renew a prescription or communicate with your care team.     To sign up for [email or web address] visit the website at [email or web address]   You will be asked to enter the access code listed below, as well as some personal information. Please follow the directions to create your username and password.     Your access code is: [email or web address]  Expires:  "2018  4:18 PM     Your access code will  in 90 days. If you need help or a new code, please contact your [facility] or call [phone] for assistance.        Care EveryWhere ID     This is your Care EveryWhere ID. This could be used by other organizations to access your [facility] medical records  [id number]        Your Vitals Were     Pulse Height Last Period BMI (Body Mass Index)          77 1.676 m (5' 6\") 2018 (Approximate) 46.77 kg/m2         Blood Pressure from Last 3 Encounters:   18 116/79   18 133/84   18 128/77    Weight from Last 3 Encounters:   18 131.5 kg (289 lb 12.8 oz)   18 130.6 kg (288 lb)   18 131.5 kg (290 lb)              We Performed the Following     Chery-Operative Worksheet (WHS)        Primary Care Provider Office Phone # Fax #    [facility] [phone]       [address] [place] [address]        Equal Access to Services     MACRINA TADEO : Hadii dania canoo Socharline, waaxda luqadaha, qaybta kaalmada adenash, hayden bailey . So [facility] [phone].    ATENCIÓN: Si habla español, tiene a taylor disposición servicios gratuitos de asistencia lingüística. [facility] [phone].    We comply with applicable federal civil rights laws and [place] laws. We do not discriminate on the basis of race, color, national origin, age, disability, sex, sexual orientation, or gender identity.            Thank you!     Thank you for choosing [facility]  for your care. Our goal is always to provide you with excellent care. Hearing back from our patients is one way we can continue to improve our services. Please take a few minutes to complete the written survey that you may receive in the mail after your visit with us. Thank you!             Your Updated Medication List - Protect others around you: Learn how to safely use, store and throw " away your medicines at www.disposemymeds.org.          This list is accurate as of 18  1:06 PM.  Always use your most recent med list.                   Brand Name Dispense Instructions for use Diagnosis    Blood Glucose Monitor System w/Device Kit      Patient not on insulin. Frequency of testin/day. . Dispense test strips and lancets per pharmacist discretion.        INSULIN ADMIN SUPPLIES      Please dispense a glucose meter:  Per pharmacist discretion        iron 325 (65 Fe) MG tablet      Take 325 mg by mouth        leucovorin 5 MG tablet    WELLCOVORIN    5 tablet    Take 3 tablets (15 mg) by mouth every other day    Ectopic pregnancy without intrauterine pregnancy, unspecified location       metFORMIN 500 MG 24 hr tablet    GLUCOPHAGE-XR     Take 1,000 mg by mouth

## 2018-08-31 ENCOUNTER — ANESTHESIA EVENT (OUTPATIENT)
Dept: INTERVENTIONAL RADIOLOGY/VASCULAR | Facility: CLINIC | Age: 38
End: 2018-08-31
Payer: MEDICAID

## 2018-08-31 ENCOUNTER — OFFICE VISIT (OUTPATIENT)
Dept: INTERPRETER SERVICES | Facility: CLINIC | Age: 38
End: 2018-08-31
Payer: MEDICAID

## 2018-08-31 ENCOUNTER — ANESTHESIA (OUTPATIENT)
Dept: SURGERY | Facility: CLINIC | Age: 38
End: 2018-08-31
Payer: MEDICAID

## 2018-08-31 ENCOUNTER — APPOINTMENT (OUTPATIENT)
Dept: INTERVENTIONAL RADIOLOGY/VASCULAR | Facility: CLINIC | Age: 38
End: 2018-08-31
Attending: PHYSICIAN ASSISTANT
Payer: MEDICAID

## 2018-08-31 ENCOUNTER — HOSPITAL ENCOUNTER (INPATIENT)
Facility: CLINIC | Age: 38
LOS: 1 days | Discharge: HOME | End: 2018-09-01
Attending: OBSTETRICS & GYNECOLOGY | Admitting: OBSTETRICS & GYNECOLOGY
Payer: MEDICAID

## 2018-08-31 DIAGNOSIS — Z98.890 S/P D&C (STATUS POST DILATION AND CURETTAGE): Primary | ICD-10-CM

## 2018-08-31 DIAGNOSIS — O00.80 OTHER ECTOPIC PREGNANCY WITHOUT INTRAUTERINE PREGNANCY: ICD-10-CM

## 2018-08-31 LAB
ABO + RH BLD: NORMAL
ABO + RH BLD: NORMAL
ALBUMIN SERPL-MCNC: 3.5 G/DL (ref 3.4–5)
ALP SERPL-CCNC: 107 U/L (ref 40–150)
ALT SERPL W P-5'-P-CCNC: 44 U/L (ref 0–50)
ANION GAP SERPL CALCULATED.3IONS-SCNC: 10 MMOL/L (ref 3–14)
APTT PPP: 26 SEC (ref 22–37)
APTT PPP: 27 SEC (ref 22–37)
AST SERPL W P-5'-P-CCNC: 33 U/L (ref 0–45)
B-HCG SERPL-ACNC: 947 IU/L (ref 0–5)
BILIRUB SERPL-MCNC: 0.4 MG/DL (ref 0.2–1.3)
BLD GP AB SCN SERPL QL: NORMAL
BLD PROD TYP BPU: NORMAL
BLD UNIT ID BPU: 0
BLD UNIT ID BPU: 0
BLOOD BANK CMNT PATIENT-IMP: NORMAL
BLOOD PRODUCT CODE: NORMAL
BLOOD PRODUCT CODE: NORMAL
BPU ID: NORMAL
BPU ID: NORMAL
BUN SERPL-MCNC: 9 MG/DL (ref 7–30)
CALCIUM SERPL-MCNC: 8.9 MG/DL (ref 8.5–10.1)
CHLORIDE SERPL-SCNC: 110 MMOL/L (ref 94–109)
CO2 SERPL-SCNC: 21 MMOL/L (ref 20–32)
CREAT SERPL-MCNC: 0.67 MG/DL (ref 0.52–1.04)
ERYTHROCYTE [DISTWIDTH] IN BLOOD BY AUTOMATED COUNT: 23.2 % (ref 10–15)
ERYTHROCYTE [DISTWIDTH] IN BLOOD BY AUTOMATED COUNT: 23.8 % (ref 10–15)
FIBRINOGEN PPP-MCNC: 334 MG/DL (ref 200–420)
FIBRINOGEN PPP-MCNC: 397 MG/DL (ref 200–420)
FIBRINOGEN PPP-MCNC: 417 MG/DL (ref 200–420)
FIBRINOGEN PPP-MCNC: NORMAL MG/DL (ref 200–420)
GFR SERPL CREATININE-BSD FRML MDRD: >90 ML/MIN/1.7M2
GLUCOSE BLDC GLUCOMTR-MCNC: 128 MG/DL (ref 70–99)
GLUCOSE BLDC GLUCOMTR-MCNC: 179 MG/DL (ref 70–99)
GLUCOSE BLDC GLUCOMTR-MCNC: 186 MG/DL (ref 70–99)
GLUCOSE BLDC GLUCOMTR-MCNC: 190 MG/DL (ref 70–99)
GLUCOSE BLDC GLUCOMTR-MCNC: 203 MG/DL (ref 70–99)
GLUCOSE SERPL-MCNC: 124 MG/DL (ref 70–99)
HCT VFR BLD AUTO: 36.4 % (ref 35–47)
HCT VFR BLD AUTO: 36.6 % (ref 35–47)
HGB BLD-MCNC: 10.1 G/DL (ref 11.7–15.7)
HGB BLD-MCNC: 11.3 G/DL (ref 11.7–15.7)
HGB BLD-MCNC: 11.4 G/DL (ref 11.7–15.7)
HGB BLD-MCNC: 9.1 G/DL (ref 11.7–15.7)
INR PPP: 1.01 (ref 0.86–1.14)
INR PPP: 1.04 (ref 0.86–1.14)
INR PPP: 1.11 (ref 0.86–1.14)
MCH RBC QN AUTO: 23.3 PG (ref 26.5–33)
MCH RBC QN AUTO: 23.7 PG (ref 26.5–33)
MCHC RBC AUTO-ENTMCNC: 31 G/DL (ref 31.5–36.5)
MCHC RBC AUTO-ENTMCNC: 31.1 G/DL (ref 31.5–36.5)
MCV RBC AUTO: 75 FL (ref 78–100)
MCV RBC AUTO: 76 FL (ref 78–100)
NUM BPU REQUESTED: 2
PLATELET # BLD AUTO: 173 10E9/L (ref 150–450)
PLATELET # BLD AUTO: 190 10E9/L (ref 150–450)
PLATELET # BLD AUTO: 192 10E9/L (ref 150–450)
PLATELET # BLD AUTO: 197 10E9/L (ref 150–450)
POTASSIUM SERPL-SCNC: 4 MMOL/L (ref 3.4–5.3)
PROT SERPL-MCNC: 7.5 G/DL (ref 6.8–8.8)
RBC # BLD AUTO: 4.82 10E12/L (ref 3.8–5.2)
RBC # BLD AUTO: 4.86 10E12/L (ref 3.8–5.2)
SODIUM SERPL-SCNC: 141 MMOL/L (ref 133–144)
SPECIMEN EXP DATE BLD: NORMAL
TRANSFUSION STATUS PATIENT QL: NORMAL
WBC # BLD AUTO: 6.3 10E9/L (ref 4–11)
WBC # BLD AUTO: 8 10E9/L (ref 4–11)

## 2018-08-31 PROCEDURE — 36415 COLL VENOUS BLD VENIPUNCTURE: CPT | Performed by: OBSTETRICS & GYNECOLOGY

## 2018-08-31 PROCEDURE — 71000015 ZZH RECOVERY PHASE 1 LEVEL 2 EA ADDTL HR

## 2018-08-31 PROCEDURE — 36000057 ZZH SURGERY LEVEL 3 1ST 30 MIN - UMMC: Performed by: OBSTETRICS & GYNECOLOGY

## 2018-08-31 PROCEDURE — 37000009 ZZH ANESTHESIA TECHNICAL FEE, EACH ADDTL 15 MIN: Performed by: OBSTETRICS & GYNECOLOGY

## 2018-08-31 PROCEDURE — 85027 COMPLETE CBC AUTOMATED: CPT | Performed by: OBSTETRICS & GYNECOLOGY

## 2018-08-31 PROCEDURE — 25000125 ZZHC RX 250: Performed by: NURSE ANESTHETIST, CERTIFIED REGISTERED

## 2018-08-31 PROCEDURE — 25000128 H RX IP 250 OP 636: Performed by: ANESTHESIOLOGY

## 2018-08-31 PROCEDURE — 85049 AUTOMATED PLATELET COUNT: CPT | Performed by: OBSTETRICS & GYNECOLOGY

## 2018-08-31 PROCEDURE — 00000146 ZZHCL STATISTIC GLUCOSE BY METER IP

## 2018-08-31 PROCEDURE — 25000125 ZZHC RX 250: Performed by: OBSTETRICS & GYNECOLOGY

## 2018-08-31 PROCEDURE — 25000128 H RX IP 250 OP 636: Performed by: RADIOLOGY

## 2018-08-31 PROCEDURE — 85049 AUTOMATED PLATELET COUNT: CPT | Performed by: RADIOLOGY

## 2018-08-31 PROCEDURE — 12000001 ZZH R&B MED SURG/OB UMMC

## 2018-08-31 PROCEDURE — 04LE3DT OCCLUSION OF RIGHT UTERINE ARTERY WITH INTRALUMINAL DEVICE, PERCUTANEOUS APPROACH: ICD-10-PCS | Performed by: RADIOLOGY

## 2018-08-31 PROCEDURE — 37244 VASC EMBOLIZE/OCCLUDE BLEED: CPT

## 2018-08-31 PROCEDURE — 25000132 ZZH RX MED GY IP 250 OP 250 PS 637: Performed by: STUDENT IN AN ORGANIZED HEALTH CARE EDUCATION/TRAINING PROGRAM

## 2018-08-31 PROCEDURE — 04LF3DU OCCLUSION OF LEFT UTERINE ARTERY WITH INTRALUMINAL DEVICE, PERCUTANEOUS APPROACH: ICD-10-PCS | Performed by: RADIOLOGY

## 2018-08-31 PROCEDURE — 25000125 ZZHC RX 250: Performed by: RADIOLOGY

## 2018-08-31 PROCEDURE — 25000128 H RX IP 250 OP 636: Performed by: STUDENT IN AN ORGANIZED HEALTH CARE EDUCATION/TRAINING PROGRAM

## 2018-08-31 PROCEDURE — 85730 THROMBOPLASTIN TIME PARTIAL: CPT | Performed by: OBSTETRICS & GYNECOLOGY

## 2018-08-31 PROCEDURE — 85610 PROTHROMBIN TIME: CPT | Performed by: OBSTETRICS & GYNECOLOGY

## 2018-08-31 PROCEDURE — 27210906 ZZH KIT CR8

## 2018-08-31 PROCEDURE — 85018 HEMOGLOBIN: CPT | Performed by: RADIOLOGY

## 2018-08-31 PROCEDURE — 37243 VASC EMBOLIZE/OCCLUDE ORGAN: CPT

## 2018-08-31 PROCEDURE — 25000128 H RX IP 250 OP 636: Performed by: NURSE ANESTHETIST, CERTIFIED REGISTERED

## 2018-08-31 PROCEDURE — 25000128 H RX IP 250 OP 636: Performed by: OBSTETRICS & GYNECOLOGY

## 2018-08-31 PROCEDURE — 27210902 ZZH KIT CR4

## 2018-08-31 PROCEDURE — 88307 TISSUE EXAM BY PATHOLOGIST: CPT | Performed by: OBSTETRICS & GYNECOLOGY

## 2018-08-31 PROCEDURE — 85384 FIBRINOGEN ACTIVITY: CPT | Performed by: OBSTETRICS & GYNECOLOGY

## 2018-08-31 PROCEDURE — 85730 THROMBOPLASTIN TIME PARTIAL: CPT | Performed by: STUDENT IN AN ORGANIZED HEALTH CARE EDUCATION/TRAINING PROGRAM

## 2018-08-31 PROCEDURE — C1760 CLOSURE DEV, VASC: HCPCS

## 2018-08-31 PROCEDURE — 88307 TISSUE EXAM BY PATHOLOGIST: CPT | Mod: 26 | Performed by: OBSTETRICS & GYNECOLOGY

## 2018-08-31 PROCEDURE — 85610 PROTHROMBIN TIME: CPT | Performed by: STUDENT IN AN ORGANIZED HEALTH CARE EDUCATION/TRAINING PROGRAM

## 2018-08-31 PROCEDURE — 86900 BLOOD TYPING SEROLOGIC ABO: CPT | Performed by: OBSTETRICS & GYNECOLOGY

## 2018-08-31 PROCEDURE — 85018 HEMOGLOBIN: CPT | Performed by: OBSTETRICS & GYNECOLOGY

## 2018-08-31 PROCEDURE — 36247 INS CATH ABD/L-EXT ART 3RD: CPT | Mod: 50

## 2018-08-31 PROCEDURE — 25000131 ZZH RX MED GY IP 250 OP 636 PS 637: Performed by: STUDENT IN AN ORGANIZED HEALTH CARE EDUCATION/TRAINING PROGRAM

## 2018-08-31 PROCEDURE — 75736 ARTERY X-RAYS PELVIS: CPT

## 2018-08-31 PROCEDURE — 84702 CHORIONIC GONADOTROPIN TEST: CPT | Performed by: OBSTETRICS & GYNECOLOGY

## 2018-08-31 PROCEDURE — 27210794 ZZH OR GENERAL SUPPLY STERILE: Performed by: OBSTETRICS & GYNECOLOGY

## 2018-08-31 PROCEDURE — T1013 SIGN LANG/ORAL INTERPRETER: HCPCS | Mod: U3

## 2018-08-31 PROCEDURE — C1887 CATHETER, GUIDING: HCPCS

## 2018-08-31 PROCEDURE — 40000170 ZZH STATISTIC PRE-PROCEDURE ASSESSMENT II: Performed by: OBSTETRICS & GYNECOLOGY

## 2018-08-31 PROCEDURE — 25000125 ZZHC RX 250: Performed by: STUDENT IN AN ORGANIZED HEALTH CARE EDUCATION/TRAINING PROGRAM

## 2018-08-31 PROCEDURE — 36415 COLL VENOUS BLD VENIPUNCTURE: CPT | Performed by: STUDENT IN AN ORGANIZED HEALTH CARE EDUCATION/TRAINING PROGRAM

## 2018-08-31 PROCEDURE — 86850 RBC ANTIBODY SCREEN: CPT | Performed by: OBSTETRICS & GYNECOLOGY

## 2018-08-31 PROCEDURE — 86901 BLOOD TYPING SEROLOGIC RH(D): CPT | Performed by: OBSTETRICS & GYNECOLOGY

## 2018-08-31 PROCEDURE — 80053 COMPREHEN METABOLIC PANEL: CPT | Performed by: OBSTETRICS & GYNECOLOGY

## 2018-08-31 PROCEDURE — P9016 RBC LEUKOCYTES REDUCED: HCPCS | Performed by: OBSTETRICS & GYNECOLOGY

## 2018-08-31 PROCEDURE — 71000014 ZZH RECOVERY PHASE 1 LEVEL 2 FIRST HR

## 2018-08-31 PROCEDURE — 85384 FIBRINOGEN ACTIVITY: CPT | Performed by: STUDENT IN AN ORGANIZED HEALTH CARE EDUCATION/TRAINING PROGRAM

## 2018-08-31 PROCEDURE — 37000008 ZZH ANESTHESIA TECHNICAL FEE, 1ST 30 MIN: Performed by: OBSTETRICS & GYNECOLOGY

## 2018-08-31 PROCEDURE — 85027 COMPLETE CBC AUTOMATED: CPT | Performed by: STUDENT IN AN ORGANIZED HEALTH CARE EDUCATION/TRAINING PROGRAM

## 2018-08-31 PROCEDURE — C1769 GUIDE WIRE: HCPCS

## 2018-08-31 PROCEDURE — 36000059 ZZH SURGERY LEVEL 3 EA 15 ADDTL MIN UMMC: Performed by: OBSTETRICS & GYNECOLOGY

## 2018-08-31 PROCEDURE — 71000027 ZZH RECOVERY PHASE 2 EACH 15 MINS: Performed by: OBSTETRICS & GYNECOLOGY

## 2018-08-31 PROCEDURE — 10T28ZZ RESECTION OF PRODUCTS OF CONCEPTION, ECTOPIC, VIA NATURAL OR ARTIFICIAL OPENING ENDOSCOPIC: ICD-10-PCS | Performed by: OBSTETRICS & GYNECOLOGY

## 2018-08-31 PROCEDURE — 86923 COMPATIBILITY TEST ELECTRIC: CPT | Performed by: OBSTETRICS & GYNECOLOGY

## 2018-08-31 RX ORDER — LIDOCAINE 40 MG/G
CREAM TOPICAL
Status: DISCONTINUED | OUTPATIENT
Start: 2018-08-31 | End: 2018-08-31 | Stop reason: HOSPADM

## 2018-08-31 RX ORDER — HYDROMORPHONE HYDROCHLORIDE 1 MG/ML
.3-.5 INJECTION, SOLUTION INTRAMUSCULAR; INTRAVENOUS; SUBCUTANEOUS EVERY 10 MIN PRN
Status: DISCONTINUED | OUTPATIENT
Start: 2018-08-31 | End: 2018-08-31 | Stop reason: HOSPADM

## 2018-08-31 RX ORDER — SODIUM CHLORIDE, SODIUM LACTATE, POTASSIUM CHLORIDE, CALCIUM CHLORIDE 600; 310; 30; 20 MG/100ML; MG/100ML; MG/100ML; MG/100ML
INJECTION, SOLUTION INTRAVENOUS CONTINUOUS
Status: DISCONTINUED | OUTPATIENT
Start: 2018-08-31 | End: 2018-08-31 | Stop reason: HOSPADM

## 2018-08-31 RX ORDER — LIDOCAINE HYDROCHLORIDE 10 MG/ML
INJECTION, SOLUTION INFILTRATION; PERINEURAL PRN
Status: DISCONTINUED | OUTPATIENT
Start: 2018-08-31 | End: 2018-08-31 | Stop reason: HOSPADM

## 2018-08-31 RX ORDER — ONDANSETRON 2 MG/ML
4 INJECTION INTRAMUSCULAR; INTRAVENOUS EVERY 30 MIN PRN
Status: DISCONTINUED | OUTPATIENT
Start: 2018-08-31 | End: 2018-08-31 | Stop reason: HOSPADM

## 2018-08-31 RX ORDER — HYDRALAZINE HYDROCHLORIDE 20 MG/ML
2.5-5 INJECTION INTRAMUSCULAR; INTRAVENOUS EVERY 10 MIN PRN
Status: DISCONTINUED | OUTPATIENT
Start: 2018-08-31 | End: 2018-08-31 | Stop reason: HOSPADM

## 2018-08-31 RX ORDER — PROPOFOL 10 MG/ML
INJECTION, EMULSION INTRAVENOUS PRN
Status: DISCONTINUED | OUTPATIENT
Start: 2018-08-31 | End: 2018-08-31

## 2018-08-31 RX ORDER — NICOTINE POLACRILEX 4 MG
15-30 LOZENGE BUCCAL
Status: DISCONTINUED | OUTPATIENT
Start: 2018-08-31 | End: 2018-09-01 | Stop reason: HOSPADM

## 2018-08-31 RX ORDER — ONDANSETRON 2 MG/ML
4 INJECTION INTRAMUSCULAR; INTRAVENOUS EVERY 6 HOURS PRN
Status: DISCONTINUED | OUTPATIENT
Start: 2018-08-31 | End: 2018-09-01 | Stop reason: HOSPADM

## 2018-08-31 RX ORDER — ONDANSETRON 4 MG/1
4 TABLET, ORALLY DISINTEGRATING ORAL EVERY 30 MIN PRN
Status: DISCONTINUED | OUTPATIENT
Start: 2018-08-31 | End: 2018-08-31 | Stop reason: HOSPADM

## 2018-08-31 RX ORDER — MEDROXYPROGESTERONE ACETATE 150 MG/ML
150 INJECTION, SUSPENSION INTRAMUSCULAR ONCE
Status: COMPLETED | OUTPATIENT
Start: 2018-09-01 | End: 2018-09-01

## 2018-08-31 RX ORDER — ACETAMINOPHEN 325 MG/1
650 TABLET ORAL
Status: DISCONTINUED | OUTPATIENT
Start: 2018-08-31 | End: 2018-08-31

## 2018-08-31 RX ORDER — METOCLOPRAMIDE 10 MG/1
10 TABLET ORAL EVERY 6 HOURS PRN
Status: DISCONTINUED | OUTPATIENT
Start: 2018-08-31 | End: 2018-09-01 | Stop reason: HOSPADM

## 2018-08-31 RX ORDER — LIDOCAINE HYDROCHLORIDE 10 MG/ML
1-30 INJECTION, SOLUTION EPIDURAL; INFILTRATION; INTRACAUDAL; PERINEURAL
Status: COMPLETED | OUTPATIENT
Start: 2018-08-31 | End: 2018-08-31

## 2018-08-31 RX ORDER — VASOPRESSIN 20 U/ML
INJECTION PARENTERAL PRN
Status: DISCONTINUED | OUTPATIENT
Start: 2018-08-31 | End: 2018-08-31 | Stop reason: HOSPADM

## 2018-08-31 RX ORDER — DOXYCYCLINE 100 MG/10ML
100 INJECTION, POWDER, LYOPHILIZED, FOR SOLUTION INTRAVENOUS
Status: COMPLETED | OUTPATIENT
Start: 2018-08-31 | End: 2018-08-31

## 2018-08-31 RX ORDER — AMOXICILLIN 250 MG
1 CAPSULE ORAL 2 TIMES DAILY
Status: DISCONTINUED | OUTPATIENT
Start: 2018-08-31 | End: 2018-09-01 | Stop reason: HOSPADM

## 2018-08-31 RX ORDER — DEXAMETHASONE SODIUM PHOSPHATE 4 MG/ML
INJECTION, SOLUTION INTRA-ARTICULAR; INTRALESIONAL; INTRAMUSCULAR; INTRAVENOUS; SOFT TISSUE PRN
Status: DISCONTINUED | OUTPATIENT
Start: 2018-08-31 | End: 2018-08-31

## 2018-08-31 RX ORDER — SODIUM CHLORIDE 9 MG/ML
INJECTION, SOLUTION INTRAVENOUS CONTINUOUS PRN
Status: DISCONTINUED | OUTPATIENT
Start: 2018-08-31 | End: 2018-08-31

## 2018-08-31 RX ORDER — ONDANSETRON 4 MG/1
4 TABLET, ORALLY DISINTEGRATING ORAL EVERY 6 HOURS PRN
Status: DISCONTINUED | OUTPATIENT
Start: 2018-08-31 | End: 2018-09-01 | Stop reason: HOSPADM

## 2018-08-31 RX ORDER — OXYCODONE HYDROCHLORIDE 5 MG/1
5-10 TABLET ORAL
Status: DISCONTINUED | OUTPATIENT
Start: 2018-08-31 | End: 2018-09-01 | Stop reason: HOSPADM

## 2018-08-31 RX ORDER — ONDANSETRON 2 MG/ML
INJECTION INTRAMUSCULAR; INTRAVENOUS PRN
Status: DISCONTINUED | OUTPATIENT
Start: 2018-08-31 | End: 2018-08-31

## 2018-08-31 RX ORDER — SODIUM CHLORIDE 9 MG/ML
INJECTION, SOLUTION INTRAVENOUS CONTINUOUS
Status: DISCONTINUED | OUTPATIENT
Start: 2018-08-31 | End: 2018-09-01 | Stop reason: HOSPADM

## 2018-08-31 RX ORDER — NALOXONE HYDROCHLORIDE 0.4 MG/ML
.1-.4 INJECTION, SOLUTION INTRAMUSCULAR; INTRAVENOUS; SUBCUTANEOUS
Status: DISCONTINUED | OUTPATIENT
Start: 2018-08-31 | End: 2018-08-31 | Stop reason: HOSPADM

## 2018-08-31 RX ORDER — OXYCODONE HCL 5 MG/5 ML
10 SOLUTION, ORAL ORAL EVERY 4 HOURS PRN
Status: DISCONTINUED | OUTPATIENT
Start: 2018-08-31 | End: 2018-09-01 | Stop reason: HOSPADM

## 2018-08-31 RX ORDER — ACETAMINOPHEN 325 MG/1
650 TABLET ORAL EVERY 4 HOURS PRN
Status: DISCONTINUED | OUTPATIENT
Start: 2018-09-03 | End: 2018-09-01 | Stop reason: HOSPADM

## 2018-08-31 RX ORDER — LIDOCAINE HYDROCHLORIDE 20 MG/ML
INJECTION, SOLUTION INFILTRATION; PERINEURAL PRN
Status: DISCONTINUED | OUTPATIENT
Start: 2018-08-31 | End: 2018-08-31

## 2018-08-31 RX ORDER — MEPERIDINE HYDROCHLORIDE 25 MG/ML
12.5 INJECTION INTRAMUSCULAR; INTRAVENOUS; SUBCUTANEOUS
Status: DISCONTINUED | OUTPATIENT
Start: 2018-08-31 | End: 2018-08-31 | Stop reason: HOSPADM

## 2018-08-31 RX ORDER — ACETAMINOPHEN 325 MG/1
975 TABLET ORAL EVERY 8 HOURS
Status: DISCONTINUED | OUTPATIENT
Start: 2018-08-31 | End: 2018-09-01 | Stop reason: HOSPADM

## 2018-08-31 RX ORDER — METOPROLOL TARTRATE 1 MG/ML
1-2 INJECTION, SOLUTION INTRAVENOUS EVERY 5 MIN PRN
Status: DISCONTINUED | OUTPATIENT
Start: 2018-08-31 | End: 2018-08-31 | Stop reason: HOSPADM

## 2018-08-31 RX ORDER — IODIXANOL 320 MG/ML
1-150 INJECTION, SOLUTION INTRAVASCULAR ONCE
Status: COMPLETED | OUTPATIENT
Start: 2018-08-31 | End: 2018-08-31

## 2018-08-31 RX ORDER — PROPOFOL 10 MG/ML
INJECTION, EMULSION INTRAVENOUS CONTINUOUS PRN
Status: DISCONTINUED | OUTPATIENT
Start: 2018-08-31 | End: 2018-08-31

## 2018-08-31 RX ORDER — PROCHLORPERAZINE MALEATE 10 MG
10 TABLET ORAL EVERY 6 HOURS PRN
Status: DISCONTINUED | OUTPATIENT
Start: 2018-08-31 | End: 2018-09-01 | Stop reason: HOSPADM

## 2018-08-31 RX ORDER — CEFAZOLIN SODIUM 1 G/50ML
3 SOLUTION INTRAVENOUS
Status: COMPLETED | OUTPATIENT
Start: 2018-08-31 | End: 2018-08-31

## 2018-08-31 RX ORDER — FENTANYL CITRATE 50 UG/ML
25-50 INJECTION, SOLUTION INTRAMUSCULAR; INTRAVENOUS
Status: DISCONTINUED | OUTPATIENT
Start: 2018-08-31 | End: 2018-08-31 | Stop reason: HOSPADM

## 2018-08-31 RX ORDER — FENTANYL CITRATE 50 UG/ML
INJECTION, SOLUTION INTRAMUSCULAR; INTRAVENOUS PRN
Status: DISCONTINUED | OUTPATIENT
Start: 2018-08-31 | End: 2018-08-31

## 2018-08-31 RX ORDER — METOCLOPRAMIDE HYDROCHLORIDE 5 MG/ML
10 INJECTION INTRAMUSCULAR; INTRAVENOUS EVERY 6 HOURS PRN
Status: DISCONTINUED | OUTPATIENT
Start: 2018-08-31 | End: 2018-09-01 | Stop reason: HOSPADM

## 2018-08-31 RX ORDER — LIDOCAINE 40 MG/G
CREAM TOPICAL
Status: DISCONTINUED | OUTPATIENT
Start: 2018-08-31 | End: 2018-09-01 | Stop reason: HOSPADM

## 2018-08-31 RX ORDER — PHYSOSTIGMINE SALICYLATE 1 MG/ML
1.2 INJECTION INTRAVENOUS
Status: DISCONTINUED | OUTPATIENT
Start: 2018-08-31 | End: 2018-08-31 | Stop reason: HOSPADM

## 2018-08-31 RX ORDER — IBUPROFEN 600 MG/1
600 TABLET, FILM COATED ORAL EVERY 6 HOURS PRN
Qty: 30 TABLET | Refills: 0 | Status: SHIPPED | OUTPATIENT
Start: 2018-08-31 | End: 2019-04-26

## 2018-08-31 RX ORDER — MEDROXYPROGESTERONE ACETATE 150 MG/ML
150 INJECTION, SUSPENSION INTRAMUSCULAR ONCE
Status: DISCONTINUED | OUTPATIENT
Start: 2018-08-31 | End: 2018-08-31

## 2018-08-31 RX ORDER — NITROGLYCERIN 5 MG/ML
100-500 VIAL (ML) INTRAVENOUS
Status: DISCONTINUED | OUTPATIENT
Start: 2018-08-31 | End: 2018-08-31 | Stop reason: HOSPADM

## 2018-08-31 RX ORDER — AMOXICILLIN 250 MG
2 CAPSULE ORAL 2 TIMES DAILY
Status: DISCONTINUED | OUTPATIENT
Start: 2018-08-31 | End: 2018-09-01 | Stop reason: HOSPADM

## 2018-08-31 RX ORDER — DEXTROSE MONOHYDRATE 25 G/50ML
25-50 INJECTION, SOLUTION INTRAVENOUS
Status: DISCONTINUED | OUTPATIENT
Start: 2018-08-31 | End: 2018-09-01 | Stop reason: HOSPADM

## 2018-08-31 RX ORDER — NALOXONE HYDROCHLORIDE 0.4 MG/ML
.1-.4 INJECTION, SOLUTION INTRAMUSCULAR; INTRAVENOUS; SUBCUTANEOUS
Status: DISCONTINUED | OUTPATIENT
Start: 2018-08-31 | End: 2018-09-01 | Stop reason: HOSPADM

## 2018-08-31 RX ADMIN — LIDOCAINE HYDROCHLORIDE 5 ML: 10 INJECTION, SOLUTION EPIDURAL; INFILTRATION; INTRACAUDAL; PERINEURAL at 13:06

## 2018-08-31 RX ADMIN — INSULIN ASPART 2 UNITS: 100 INJECTION, SOLUTION INTRAVENOUS; SUBCUTANEOUS at 20:54

## 2018-08-31 RX ADMIN — IODIXANOL 62 ML: 320 INJECTION, SOLUTION INTRAVASCULAR at 13:06

## 2018-08-31 RX ADMIN — Medication 0.3 MG: at 14:31

## 2018-08-31 RX ADMIN — PROPOFOL 30 MG: 10 INJECTION, EMULSION INTRAVENOUS at 08:11

## 2018-08-31 RX ADMIN — PROPOFOL 20 MG: 10 INJECTION, EMULSION INTRAVENOUS at 08:00

## 2018-08-31 RX ADMIN — INSULIN ASPART 1 UNITS: 100 INJECTION, SOLUTION INTRAVENOUS; SUBCUTANEOUS at 18:40

## 2018-08-31 RX ADMIN — DOXYCYCLINE 100 MG: 100 INJECTION, POWDER, LYOPHILIZED, FOR SOLUTION INTRAVENOUS at 08:02

## 2018-08-31 RX ADMIN — SODIUM CHLORIDE, POTASSIUM CHLORIDE, SODIUM LACTATE AND CALCIUM CHLORIDE: 600; 310; 30; 20 INJECTION, SOLUTION INTRAVENOUS at 12:41

## 2018-08-31 RX ADMIN — PHENYLEPHRINE HYDROCHLORIDE 0.4 MCG/KG/MIN: 10 INJECTION, SOLUTION INTRAMUSCULAR; INTRAVENOUS; SUBCUTANEOUS at 11:56

## 2018-08-31 RX ADMIN — DEXAMETHASONE SODIUM PHOSPHATE 6 MG: 4 INJECTION, SOLUTION INTRAMUSCULAR; INTRAVENOUS at 08:03

## 2018-08-31 RX ADMIN — FENTANYL CITRATE 25 MCG: 50 INJECTION, SOLUTION INTRAMUSCULAR; INTRAVENOUS at 08:37

## 2018-08-31 RX ADMIN — PROPOFOL 50 MCG/KG/MIN: 10 INJECTION, EMULSION INTRAVENOUS at 11:50

## 2018-08-31 RX ADMIN — FENTANYL CITRATE 25 MCG: 50 INJECTION INTRAMUSCULAR; INTRAVENOUS at 13:37

## 2018-08-31 RX ADMIN — PROPOFOL 50 MG: 10 INJECTION, EMULSION INTRAVENOUS at 07:49

## 2018-08-31 RX ADMIN — FENTANYL CITRATE 25 MCG: 50 INJECTION, SOLUTION INTRAMUSCULAR; INTRAVENOUS at 12:46

## 2018-08-31 RX ADMIN — SENNOSIDES AND DOCUSATE SODIUM 1 TABLET: 8.6; 5 TABLET ORAL at 20:54

## 2018-08-31 RX ADMIN — SODIUM CHLORIDE: 9 INJECTION, SOLUTION INTRAVENOUS at 16:33

## 2018-08-31 RX ADMIN — FENTANYL CITRATE 25 MCG: 50 INJECTION INTRAMUSCULAR; INTRAVENOUS at 14:06

## 2018-08-31 RX ADMIN — MIDAZOLAM 2 MG: 1 INJECTION INTRAMUSCULAR; INTRAVENOUS at 11:56

## 2018-08-31 RX ADMIN — PROPOFOL 5 MCG/KG/MIN: 10 INJECTION, EMULSION INTRAVENOUS at 07:48

## 2018-08-31 RX ADMIN — ACETAMINOPHEN 975 MG: 325 TABLET, FILM COATED ORAL at 18:11

## 2018-08-31 RX ADMIN — ONDANSETRON 4 MG: 2 INJECTION INTRAMUSCULAR; INTRAVENOUS at 12:21

## 2018-08-31 RX ADMIN — Medication 3 G: at 12:13

## 2018-08-31 RX ADMIN — LIDOCAINE HYDROCHLORIDE 60 MG: 20 INJECTION, SOLUTION INFILTRATION; PERINEURAL at 07:48

## 2018-08-31 RX ADMIN — FENTANYL CITRATE 50 MCG: 50 INJECTION, SOLUTION INTRAMUSCULAR; INTRAVENOUS at 08:00

## 2018-08-31 RX ADMIN — FENTANYL CITRATE 25 MCG: 50 INJECTION, SOLUTION INTRAMUSCULAR; INTRAVENOUS at 13:05

## 2018-08-31 RX ADMIN — ACETAMINOPHEN 650 MG: 325 TABLET, FILM COATED ORAL at 09:25

## 2018-08-31 RX ADMIN — SODIUM CHLORIDE, POTASSIUM CHLORIDE, SODIUM LACTATE AND CALCIUM CHLORIDE: 600; 310; 30; 20 INJECTION, SOLUTION INTRAVENOUS at 07:43

## 2018-08-31 RX ADMIN — MIDAZOLAM 2 MG: 1 INJECTION INTRAMUSCULAR; INTRAVENOUS at 07:42

## 2018-08-31 RX ADMIN — DOXYCYCLINE 200 MG: 100 INJECTION, POWDER, LYOPHILIZED, FOR SOLUTION INTRAVENOUS at 18:34

## 2018-08-31 RX ADMIN — HEPARIN SODIUM 500 UNITS: 1000 INJECTION, SOLUTION INTRAVENOUS; SUBCUTANEOUS at 13:07

## 2018-08-31 RX ADMIN — SODIUM CHLORIDE, POTASSIUM CHLORIDE, SODIUM LACTATE AND CALCIUM CHLORIDE: 600; 310; 30; 20 INJECTION, SOLUTION INTRAVENOUS at 11:45

## 2018-08-31 RX ADMIN — Medication 0.3 MG: at 15:34

## 2018-08-31 RX ADMIN — PROPOFOL 50 MG: 10 INJECTION, EMULSION INTRAVENOUS at 08:36

## 2018-08-31 RX ADMIN — SODIUM CHLORIDE: 9 INJECTION, SOLUTION INTRAVENOUS at 12:02

## 2018-08-31 ASSESSMENT — ACTIVITIES OF DAILY LIVING (ADL)
TRANSFERRING: 2 - ASSISTIVE PERSON
RETIRED_EATING: 0-->INDEPENDENT
DRESS: 3 - ASSISTIVE EQUIPMENT AND PERSON
TOILETING: 0-->INDEPENDENT
DRESS: 0-->INDEPENDENT
TOILETING: 3 - ASSISTIVE EQUIPMENT AND PERSON
AMBULATION: 3 - ASSISTIVE EQUIPMENT AND PERSON
RETIRED_COMMUNICATION: 0-->UNDERSTANDS/COMMUNICATES WITHOUT DIFFICULTY
ADLS_ACUITY_SCORE: 12
WHICH_OF_THE_ABOVE_FUNCTIONAL_RISKS_HAD_A_RECENT_ONSET_OR_CHANGE?: AMBULATION;TRANSFERRING
EATING: 0 - INDEPENDENT
BATHING: 0-->INDEPENDENT
TRANSFERRING: 0-->INDEPENDENT
SWALLOWING: 0-->SWALLOWS FOODS/LIQUIDS WITHOUT DIFFICULTY
FALL_HISTORY_WITHIN_LAST_SIX_MONTHS: NO
CURRENT_FUNCTIONAL_LEVEL_COMMENT: SBA
COMMUNICATION: 2 - DIFFICULTY SPEAKING (NOT RELATED TO LANGUAGE BARRIER)
CHANGE_IN_FUNCTIONAL_STATUS_SINCE_ONSET_OF_CURRENT_ILLNESS/INJURY: YES
AMBULATION: 0-->INDEPENDENT
SWALLOWING: 0 - SWALLOWS FOODS/LIQUIDS WITHOUT DIFFICULTY
BATHING: 3 - ASSISTIVE EQUIPMENT AND PERSON
COGNITION: 0 - NO COGNITION ISSUES REPORTED

## 2018-08-31 NOTE — LETTER
[facility] [address]  [address]  [place] [address]  [phone]          September 1, 2018    RE:  Joann Gann                                                                                                                                                       [address]   [facility] [address]            To whom it may concern:    Carroll Garcia has been in the hospital with his wife, Joann Gann, from 8/31 - 9/1. Please excuse him from work during this time. Thank you.       Sincerely,        Afua Trimble MD  OB/GYN Resident

## 2018-08-31 NOTE — ANESTHESIA POSTPROCEDURE EVALUATION
Patient: Joann Gann    * No procedures listed *    Diagnosis:* No pre-op diagnosis entered *  Diagnosis Additional Information: No value filed.    Anesthesia Type:  MAC    Note:  Anesthesia Post Evaluation    Patient location during evaluation: PACU  Patient participation: Able to fully participate in evaluation  Level of consciousness: awake  Pain management: adequate  Airway patency: patent  Cardiovascular status: acceptable  Respiratory status: acceptable  Hydration status: acceptable  PONV: none     Anesthetic complications: None    Comments: Stable recovery noted.        Last vitals:  Vitals:    08/31/18 1330 08/31/18 1345 08/31/18 1400   BP: 103/54 106/52 108/65   Pulse:      Resp: 13 18 24   Temp:   36.4  C (97.5  F)   SpO2: 98% 99% 100%         Electronically Signed By: Erich Lowry MD  August 31, 2018  2:33 PM

## 2018-08-31 NOTE — ANESTHESIA CARE TRANSFER NOTE
Patient: Joann Gann    Procedure(s):  Operatvie Hysteroscopy - Wound Class: II-Clean Contaminated    Diagnosis:  Scar   Diagnosis Additional Information: No value filed.    Anesthesia Type:   MAC     Note:  Airway :Room Air  Patient transferred to:Phase II  Comments: Joann arrived in PACU sleeping on her back with the HOB elevated.  Report given and questions answered.Handoff Report: Identifed the Patient, Identified the Reponsible Provider, Reviewed the pertinent medical history, Discussed the surgical course, Reviewed Intra-OP anesthesia mangement and issues during anesthesia, Set expectations for post-procedure period and Allowed opportunity for questions and acknowledgement of understanding      Vitals: (Last set prior to Anesthesia Care Transfer)    CRNA VITALS  2018 0817 - 2018 0855      2018             Resp Rate (observed): (!)  1                Electronically Signed By: Erich Campa CRNA, APRN CRNA  2018  8:55 AM

## 2018-08-31 NOTE — ANESTHESIA CARE TRANSFER NOTE
Patient: Joann Gann    * No procedures listed *    Diagnosis: * No pre-op diagnosis entered *  Diagnosis Additional Information: No value filed.    Anesthesia Type:   No value filed.     Note:  Airway :Nasal Cannula  Patient transferred to:PACU  Handoff Report: Identifed the Patient, Identified the Reponsible Provider, Reviewed the pertinent medical history, Discussed the surgical course, Reviewed Intra-OP anesthesia mangement and issues during anesthesia, Set expectations for post-procedure period and Allowed opportunity for questions and acknowledgement of understanding      Vitals: (Last set prior to Anesthesia Care Transfer)    CRNA VITALS  8/31/2018 1252 - 8/31/2018 1329      8/31/2018             Pulse: 54    SpO2: 100 %                Electronically Signed By: OLY Burton CRNA  August 31, 2018  1:29 PM

## 2018-08-31 NOTE — IP AVS SNAPSHOT
MRN:[id number]                      After Visit Summary   8/31/2018    Joann Gann    MRN: [id number]           Thank you!     Thank you for choosing [facility] for your care. Our goal is always to provide you with excellent care. Hearing back from our patients is one way we can continue to improve our services. Please take a few minutes to complete the written survey that you may receive in the mail after you visit with us. Thank you!        Patient Information     Date Of Birth          1980        About your hospital stay     You were admitted on:  August 31, 2018 You last received care in the:  [facility] [address] [facility]    You were discharged on:  September 1, 2018       Who to Call     For medical emergencies, please call 911.  For non-urgent questions about your medical care, please call your primary care provider or clinic, [phone]  For questions related to your surgery, please call your surgery clinic        Attending Provider     Provider Specialty    Lisa Drake MD OB/Gyn       Primary Care Provider Office Phone # Fax #    [facility] [phone]      After Care Instructions     Discharge Instructions       Call clinic or report to ED if any of the following occur:   - Fever above 100.4 degrees farenheit for two measurements   - Abdominal pain not relieved by ibuprofen  - Dizziness or lightheadedness that does not go away with water and sitting  - Nausea and vomiting            Discharge Instructions       Pelvic Rest. No tampons, douching or intercourse for 2 weeks. No intercourse until bHCG is found to be zero.            Discharge Instructions       Come back to get your blood drawn on Friday September 7th.     Call the clinic to schedule a postoperative appointment in 1-2 weeks.            No alcohol       NO ALCOHOL for 24 hours post procedure            No driving or operating machinery       No driving or operating machinery  until day after procedure                  Your next 10 appointments already scheduled     Sep 14, 2018  1:00 PM CDT   Return Visit with Lisa Beal MD   [facility] ([facility])    [facility] [address]  [address]  [facility] [address] [phone]              Future tests that were ordered for you     HCG Quantitative Pregnancy, Blood (KRQ508)                 Further instructions from your care team       Discharge Instructions: Following a Dilation   and Curettage    What to expect:    Expect small to moderate amount of vaginal bleeding which should taper off in 4-5 days. It should not be heavier than your regular menstrual flow.    Do not douche, and use a pad rather than tampons.     No intercourse until bleeding has ceased.    Activity:    Rest the day of surgery. You may resume normal activity the next day.    You may bathe or shower.    Avoid heavy lifting (10-15 lbs) for one week.    Comfort:    The amount of discomfort you can expect is very unpredictable. If you have pain that cannot be controlled with non-aspirin pain relievers or with the prescription you may have received, you should notify your doctor.    Abdominal cramping (like menstrual cramps) or low back ache are common and should not be a cause for concern. You will be drowsy and weak the day of surgery and possibly the following day.    Diet:    You have no restrictions on your diet. Following surgery, drink plenty of fluids and eat a light meal.    Nausea:    The anesthesia medications you received during your surgical procedure may produce some nausea.    If you feel nauseated, stay in bed, keep your head down and try drinking fluids such as Seven-Up, tea or soup.    Notify Physician at once if you experience:    A fever over 100 degrees (a low grade fever under 100 degrees is usual after surgery).    Heavy flow and/or passing large clots. Saturating more  than 1 pad per hour for 2 or more hours.     Severe pain or cramps.  Rev.     Same-Day Surgery   Adult Discharge Orders & Instructions     For 24 hours after surgery:  1. Get plenty of rest.  A responsible adult must stay with you for at least 24 hours after you leave the hospital.   2. Pain medication can slow your reflexes. Do not drive or use heavy equipment.  If you have weakness or tingling, don't drive or use heavy equipment until this feeling goes away.  3. Mixing alcohol and pain medication can cause dizziness and slow your breathing. It can even be fatal. Do not drink alcohol while taking pain medication.  4. Avoid strenuous or risky activities.  Ask for help when climbing stairs.   5. You may feel lightheaded.  If so, sit for a few minutes before standing.  Have someone help you get up.   6. If you have nausea (feel sick to your stomach), drink only clear liquids such as apple juice, ginger ale, broth or 7-Up.  Rest may also help.  Be sure to drink enough fluids.  Move to a regular diet as you feel able. Take pain medications with a small amount of solid food, such as toast or crackers, to avoid nausea.   7. A slight fever is normal. Call the doctor if your fever is over 100 F (37.7 C) (taken under the tongue) or lasts longer than 24 hours.  8. You may have a dry mouth, muscle aches, trouble sleeping or a sore throat.  These symptoms should go away after 24 hours.  9. Do not make important or legal decisions.   Pain Management:      1. Take pain medication (if prescribed) for pain as directed by your physician.        2. WARNING: If the pain medication you have been prescribed contains Tylenol  (acetaminophen), DO NOT take additional doses of Tylenol (acetaminophen).     Call your doctor for any of the followin.  Signs of infection (fever, growing tenderness at the surgery site, severe pain, a large amount of drainage or bleeding, foul-smelling drainage, redness, swelling).    2.  It has been over  "8 to 10 hours since surgery and you are still not able to urinate (pee).    3.  Headache for over 24 hours.    4.  Numbness, tingling or weakness the day after surgery (if you had spinal anesthesia).  To contact a doctor, call Dr. Costello or:      [phone] and ask for the Resident On Call for:          OB/GYN (answered 24 hours a day)      Emergency Department:  [place] [facility] Department: [phone]  [place] [facility] Department: [phone]                       Pending Results     Date and Time Order Name Status Description    2018 0836 Surgical pathology exam In process             Statement of Approval     Ordered          18 1041  I have reviewed and agree with all the recommendations and orders detailed in this document.  EFFECTIVE NOW     Approved and electronically signed by:  Lou Singleton MD             Admission Information     Date & Time Provider Department Dept. Phone    2018 Lisa Drake MD [facility] [phone]      Your Vitals Were     Blood Pressure Pulse Temperature Respirations Height Weight    117/60 (BP Location: Left arm) 87 97.8  F (36.6  C) (Oral) 18 1.676 m (5' 6\") 131.6 kg (290 lb 3.2 oz)    Last Period Pulse Oximetry BMI (Body Mass Index)             2018 (Approximate) 98% 46.84 kg/m2         MyChart Information     [facility] lets you send messages to your doctor, view your test results, renew your prescriptions, schedule appointments and more. To sign up, go to [email or web address] . Click on \"Log in\" on the left side of the screen, which will take you to the Welcome page. Then click on \"Sign up Now\" on the right side of the page.     You will be asked to enter the access code listed below, as well as some personal information. Please follow the directions to create your username and password.     Your access code is: [email or web address]  Expires: 2018  4:18 PM     Your access code will  in 90 days. If you " need help or a new code, please call your [facility] clinic or [phone].        Care EveryWhere ID     This is your Care EveryWhere ID. This could be used by other organizations to access your [facility] medical records  [id number]        Equal Access to Services     NEEMACHASE CARLYLE : Hadii dania joseph rachaelhawk Socharline, waaxda luqadaha, qaybta kaalmada adeegyada, hayden chuy ashleejahaira baumnatalie garybernard echeverria. So [facility] [phone].    ATENCIÓN: Si habla español, tiene a taylor disposición servicios gratuitos de asistencia lingüística. Llame al [phone].    We comply with applicable federal civil rights laws and [place] laws. We do not discriminate on the basis of race, color, national origin, age, disability, sex, sexual orientation, or gender identity.               Review of your medicines      START taking        Dose / Directions    ibuprofen 600 MG tablet   Commonly known as:  ADVIL/MOTRIN   Used for:  S/P D&C (status post dilation and curettage)        Dose:  600 mg   Take 1 tablet (600 mg) by mouth every 6 hours as needed for pain (mild)   Quantity:  30 tablet   Refills:  0         CONTINUE these medicines which have NOT CHANGED        Dose / Directions    Blood Glucose Monitor System w/Device Kit        Patient not on insulin. Frequency of testin/day. . Dispense test strips and lancets per pharmacist discretion.   Refills:  0       INSULIN ADMIN SUPPLIES        Please dispense a glucose meter:  Per pharmacist discretion   Refills:  0       iron 325 (65 Fe) MG tablet        Dose:  325 mg   Take 325 mg by mouth   Refills:  0       metFORMIN 500 MG 24 hr tablet   Commonly known as:  GLUCOPHAGE-XR        Dose:  1000 mg   Take 1,000 mg by mouth   Refills:  0            Where to get your medicines      These medications were sent to [facility] [place], [place] - [address]  [address] [facility] [address] [address]     Phone:  [phone]     ibuprofen 600 MG tablet                Protect  others around you: Learn how to safely use, store and throw away your medicines at www.disposemymeds.org.             Medication List: This is a list of all your medications and when to take them. Check marks below indicate your daily home schedule. Keep this list as a reference.      Medications           Morning Afternoon Evening Bedtime As Needed    Blood Glucose Monitor System w/Device Kit   Patient not on insulin. Frequency of testin/day. . Dispense test strips and lancets per pharmacist discretion.                                ibuprofen 600 MG tablet   Commonly known as:  ADVIL/MOTRIN   Take 1 tablet (600 mg) by mouth every 6 hours as needed for pain (mild)                                INSULIN ADMIN SUPPLIES   Please dispense a glucose meter:  Per pharmacist discretion                                iron 325 (65 Fe) MG tablet   Take 325 mg by mouth                                metFORMIN 500 MG 24 hr tablet   Commonly known as:  GLUCOPHAGE-XR   Take 1,000 mg by mouth

## 2018-08-31 NOTE — PROGRESS NOTES
"Patient arrived from PACU via cart,  She appears to be sleepy but responded to questions via . She alert and oriented but lethargic. Denies pain/discomfort. Denies numbness/tingling. No bleeding or hematoma at puncture site. Vital Signs WNL. Family at bedside, nursing will continue to monitor per care plan.  /70  Pulse 106  Temp 98.1  F (36.7  C) (Oral)  Resp 16  Ht 1.676 m (5' 6\")  Wt 131.6 kg (290 lb 3.2 oz)  LMP 06/05/2018 (Approximate)  SpO2 100%  BMI 46.84 kg/m2    "

## 2018-08-31 NOTE — OR NURSING
Dr. Lowry notified that pt blood glucose is 190. No interventions needed at this time.  Will recheck again in one hour per MDA.

## 2018-08-31 NOTE — CONSULTS
INTERVENTIONAL RADIOLOGY CONSULT    Joann Gann is a 38 year old female who was having a removal of ectopic pregnancy along  scar with post operative bleeding about 200 mL. OB/GYN consulting IR for uterine artery embolization.    Discussed with Dr. Hurley and OB/GYN resident.    Aron Ngo PA-C  Interventional Radiology  [phone] (daytime IR pager)

## 2018-08-31 NOTE — PROGRESS NOTES
"Brief note  8/31/2018  11:07 AM     Called urgently to postoperaive room for heavy bleeding Joann was about to discharge when she was noted to have e soaked chux underneath her, dripping blood ongoing. She is conversant but crying, appears stable.   Patient Vitals for the past 12 hrs:   BP Temp Temp src Pulse Resp SpO2 Height Weight   08/31/18 0945 97/64 - - - - 98 % - -   08/31/18 0930 108/66 - - - - 96 % - -   08/31/18 0915 110/74 - - - - 94 % - -   08/31/18 0900 112/80 - - - - 93 % - -   08/31/18 0853 116/80 97.1  F (36.2  C) Axillary - 14 93 % - -   08/31/18 0600 (!) 130/92 97.7  F (36.5  C) Oral 106 18 96 % 1.676 m (5' 6\") 132.8 kg (292 lb 12.3 oz)   \    About 200 mL blood on Chux and pad      A/P  Coags now  Call IR for emergent UAE   Dr. Costello present for discussion    Loren Obando MD PGY4  OB/Gyn  8/31/2018, 11:08 AM      "

## 2018-08-31 NOTE — OR NURSING
Per Dr. Lowry, pt okay to transfer to floor. Due to 2 occurances in chart/ emergency not able to get into correct chart to sign patient out.

## 2018-08-31 NOTE — ANESTHESIA PREPROCEDURE EVALUATION
Anesthesia Evaluation     . Pt has had prior anesthetic. Type: General           ROS/MED HX    ENT/Pulmonary:     (+)CATINA risk factors obese, , . .    Neurologic:  - neg neurologic ROS     Cardiovascular:  - neg cardiovascular ROS       METS/Exercise Tolerance:     Hematologic:  - neg hematologic  ROS       Musculoskeletal:  - neg musculoskeletal ROS       GI/Hepatic:  - neg GI/hepatic ROS       Renal/Genitourinary:  - ROS Renal section negative       Endo:     (+) type I DM, Normal glucose range: 140's Obesity, .      Psychiatric:  - neg psychiatric ROS       Infectious Disease:  - neg infectious disease ROS       Malignancy:      - no malignancy   Other: Comment: , S/P C/S scar ectopic pregnancy.                    Physical Exam  Normal systems: cardiovascular, pulmonary and dental    Airway   Mallampati: III  TM distance: >3 FB  Neck ROM: full    Dental     Cardiovascular       Pulmonary     Other findings:     Large tongue.    Labs (3018)                      H/H                      11.5/36.7                  Plt                        192                  Anesthesia Plan      History & Physical Review  History and physical reviewed and following examination; no interval change.    ASA Status:  3 .    NPO Status:  > 6 hours    Plan for MAC with Propofol induction. Maintenance will be TIVA.  Reason for MAC:  Deep or markedly invasive procedure (G8)  PONV prophylaxis:  Ondansetron (or other 5HT-3) and Dexamethasone or Solumedrol            Postoperative Care  Postoperative pain management:  IV analgesics.      Consents  Anesthetic plan, risks, benefits and alternatives discussed with:  Patient and Other (See Comment)..                          .    Plan - MAC Anesthesia        Risks, benefits and possible complications of MAC anesthesia, including conversion to GEN anesthesia if necessary,  discussed in full with the patient.  She voices understanding and wishes to proceed.      Dr. Teodora Barbour,  MD  Anesthesia  08/30/2018 @ 0628 am.

## 2018-08-31 NOTE — ANESTHESIA PREPROCEDURE EVALUATION
Anesthesia Evaluation     . Pt has had prior anesthetic. Type: MAC and General           ROS/MED HX    ENT/Pulmonary:  - neg pulmonary ROS     Neurologic:  - neg neurologic ROS     Cardiovascular:  - neg cardiovascular ROS       METS/Exercise Tolerance:     Hematologic:  - neg hematologic  ROS       Musculoskeletal:  - neg musculoskeletal ROS       GI/Hepatic:  - neg GI/hepatic ROS       Renal/Genitourinary:  - ROS Renal section negative       Endo:     (+) type II DM Not using insulin .      Psychiatric:  - neg psychiatric ROS       Infectious Disease:  - neg infectious disease ROS       Malignancy:      - no malignancy   Other: Comment: Bleeding from uyrtud   (+) No chance of pregnancy                    Physical Exam  Normal systems: cardiovascular, pulmonary and dental    Airway   Mallampati: II  TM distance: >3 FB  Neck ROM: full    Dental     Cardiovascular       Pulmonary    breath sounds clear to auscultation                    Anesthesia Plan      History & Physical Review  History and physical reviewed and following examination; no interval change.    ASA Status:  3 emergent.    NPO Status:  > 8 hours    Plan for MAC with Other induction. Maintenance will be Other.  Reason for MAC:  Other - see comments (Emergency bleeding)  PONV prophylaxis:  Ondansetron (or other 5HT-3) and Dexamethasone or Solumedrol       Postoperative Care  Postoperative pain management:  Multi-modal analgesia.      Consents  Anesthetic plan, risks, benefits and alternatives discussed with:  Patient.  Use of blood products discussed: Yes.   Use of blood products discussed with Patient.  Consented to blood products.  .                          .

## 2018-08-31 NOTE — PROGRESS NOTES
Pt is a 39 yo with hx of ectopic pregnancy attached to previous C/S scar. PMH significant for DM and obesity.    S/P Dilation and Curettage this am via MAC anesthesia without incident.  Prior to discharge in Phase 2 recovery, pt had episode of post op bleeding.  Dr. Costello and OB team at the bedside.  Pt to be transferred to IR for emergent embolization.      Dr. Teodora Barbour MD  Anesthesia  08/31/2018 @ 1200.

## 2018-08-31 NOTE — DISCHARGE INSTRUCTIONS
Discharge Instructions: Following a Dilation   and Curettage    What to expect:    Expect small to moderate amount of vaginal bleeding which should taper off in 4-5 days. It should not be heavier than your regular menstrual flow.    Do not douche, and use a pad rather than tampons.     No intercourse until bleeding has ceased.    Activity:    Rest the day of surgery. You may resume normal activity the next day.    You may bathe or shower.    Avoid heavy lifting (10-15 lbs) for one week.    Comfort:    The amount of discomfort you can expect is very unpredictable. If you have pain that cannot be controlled with non-aspirin pain relievers or with the prescription you may have received, you should notify your doctor.    Abdominal cramping (like menstrual cramps) or low back ache are common and should not be a cause for concern. You will be drowsy and weak the day of surgery and possibly the following day.    Diet:    You have no restrictions on your diet. Following surgery, drink plenty of fluids and eat a light meal.    Nausea:    The anesthesia medications you received during your surgical procedure may produce some nausea.    If you feel nauseated, stay in bed, keep your head down and try drinking fluids such as Seven-Up, tea or soup.    Notify Physician at once if you experience:    A fever over 100 degrees (a low grade fever under 100 degrees is usual after surgery).    Heavy flow and/or passing large clots. Saturating more than 1 pad per hour for 2 or more hours.     Severe pain or cramps.  Rev. 5/12    Same-Day Surgery   Adult Discharge Orders & Instructions     For 24 hours after surgery:  1. Get plenty of rest.  A responsible adult must stay with you for at least 24 hours after you leave the hospital.   2. Pain medication can slow your reflexes. Do not drive or use heavy equipment.  If you have weakness or tingling, don't drive or use heavy equipment until this feeling goes away.  3. Mixing alcohol and pain  medication can cause dizziness and slow your breathing. It can even be fatal. Do not drink alcohol while taking pain medication.  4. Avoid strenuous or risky activities.  Ask for help when climbing stairs.   5. You may feel lightheaded.  If so, sit for a few minutes before standing.  Have someone help you get up.   6. If you have nausea (feel sick to your stomach), drink only clear liquids such as apple juice, ginger ale, broth or 7-Up.  Rest may also help.  Be sure to drink enough fluids.  Move to a regular diet as you feel able. Take pain medications with a small amount of solid food, such as toast or crackers, to avoid nausea.   7. A slight fever is normal. Call the doctor if your fever is over 100 F (37.7 C) (taken under the tongue) or lasts longer than 24 hours.  8. You may have a dry mouth, muscle aches, trouble sleeping or a sore throat.  These symptoms should go away after 24 hours.  9. Do not make important or legal decisions.   Pain Management:      1. Take pain medication (if prescribed) for pain as directed by your physician.        2. WARNING: If the pain medication you have been prescribed contains Tylenol  (acetaminophen), DO NOT take additional doses of Tylenol (acetaminophen).     Call your doctor for any of the followin.  Signs of infection (fever, growing tenderness at the surgery site, severe pain, a large amount of drainage or bleeding, foul-smelling drainage, redness, swelling).    2.  It has been over 8 to 10 hours since surgery and you are still not able to urinate (pee).    3.  Headache for over 24 hours.    4.  Numbness, tingling or weakness the day after surgery (if you had spinal anesthesia).  To contact a doctor, call Dr. Costello or:      [phone] and ask for the Resident On Call for:          OB/GYN (answered 24 hours a day)      Emergency Department:  [place] Emergency Department: [phone]  [place] Emergency Department: [phone]

## 2018-08-31 NOTE — OR NURSING
Gave patient dc instructions. Patient states she feels like she is peeing in the chair. Went to get her up to get dressed and use bathroom, large pool of blood under her in chair.     Dr Costello paged ASAP, came to room. Large amount of blood and clots present.   Stat labs ordered, Dr Costello talking with IR re: embolization procedure.     Patient asking to void, sat on commode prior to putting back on cart. Voided large amount with blood, large clots.   Patient and SO updated at length with Dr Costello and team on plan.     Taking to IR ASAP for procedure with Dr Costello and OB team.   Patient understandably very tearful.     (Patient has had no food postop, only 100cc of cranberry juice)

## 2018-08-31 NOTE — IP AVS SNAPSHOT
[facility]    [address]    [place] [address]    Phone:  [phone]                                       After Visit Summary   8/31/2018    Joann Gann    MRN: [id number]           After Visit Summary Signature Page     I have received my discharge instructions, and my questions have been answered. I have discussed any challenges I see with this plan with the nurse or doctor.    ..........................................................................................................................................  Patient/Patient Representative Signature      ..........................................................................................................................................  Patient Representative Print Name and Relationship to Patient    ..................................................               ................................................  Date                                            Time    ..........................................................................................................................................  Reviewed by Signature/Title    ...................................................              ..............................................  Date                                                            Time          22EPIC Rev 08/18

## 2018-08-31 NOTE — OR NURSING
Got patient up to chair as requested by Dr Costello. No bleeding with standing. Minimal amount bloody drainage on pad.   Patient c/o being dizzy with movement and while up in recliner chair. Encouraging fluids and food.     Blood sugar checked on admission to phase II, 128

## 2018-08-31 NOTE — OP NOTE
[facility] Gynecology  Full Operative Note   Date of Service: 2018  Surgeon:  Lisa Costello MD  Assistants:  Maxine Thomas MD, PGY-1    Loren Obando MD, PGY-4    Preop Dx:   scar pregnancy, desires future fertility    Status post medical management of CSP (at 8w5d)    Type 2 DM    Morbid obesity, BMI 46    Postop Dx:  Same    Procedure:  EUA, operative hysteroscopy    Anesthesia:  MAC & Local  EBL:  75 mL  IVF:  700 mL   UOP:  Not collected  Drains:  None  Fluid Deficit: 1505 mL   Specimens:  Uterine mass  Complications:  None apparent    Indications:   Ms. Joann Gann is a 38 year old  who at 8w5d was diagnosed and began medical management for a  scar pregnancy (desires future fertility).  She is now s/p high dose methotrexate treatment with decreasing bHCG and a recent US showing gestational sac and embryo w/o cardiac activity (collapsed compared to initial ultrasound). Given change in ultrasound and response to medical management, she was counseled about hysteroscopy with resection of remaining pregnancy tissue and elected to proceed.  The risks, benefits, and alternatives were discussed with the patient, and she agreed to proceed.     Findings:   EUA: difficult secondary habitus, but apparently midposition to anteverted uterus, short cervix. Hysteroscopy: shaggy, irregular mass of tissue consistent with GS and villi visible at the internal cervical os obscuring the view of the uterine fundus (filled the uterine cavity).  At cessation of procedure, 50-60% of this tissue had been resected.  Bleeding during the case was minimal, at completion, bleeding was moderate but slowed to minimal.    Procedure Details:   The patient was brought to the operating room where deep sedation was administered. Exam under anesthesia revealed the findings noted above. The patient was prepped and draped in the usual sterile fashion.  A surgical timeout was performed.    A sterile speculum was  placed. The anterior lip of the cervix was then grasped with a single tooth tenaculum. A paracervical block was performed with a total of 20 mL of 1% lidocaine with vasopressin injected at 4 and 8 o'clock in the cervicovaginal junction. The cervix was dilated easily to 21 Cambodian with Headley dilators. The hysteroscope was placed, and the uterine cavity was explored. Findings as above. Hysteroscopic resection with the Myosure device was performed, with resection of approximately 50% of the visible mass. The tenaculum was remove and tenaculum sites were noted to be hemostatic after application of pressure and silver nitrate. Bleeding was noted from the cervical os, which slowed after approximately 10 minutes of observation. All instruments were removed from the vagina.     The sponge, needle, and instrument counts were correct. The patient tolerated the procedure well and was transferred to recovery in stable condition. Dr. Costello was present and scrubbed for the entirety of the procedure.     Maxine Thomas MD  OB/GYN, PGY-1  8/31/2018, 7:13 AM    I participated in all aspects of Joann Gann's case with Dr. Thomas on 8/31/2018 and agree with the operative details in this note with edits by me.     Lisa Costello MD MPH      Addendum:  Approximately 70 minutes after the patient arrived back to Phase II recovery, I was paged to come and assess her bleeding.  Bleeding was assessed first at 10 minutes after arrival to Phase II (as instructed) and was minimal.  Per report when patient shifted position in her chair over an hour later, she noticed wetness and gush from the vagina.  Upon my arrival back to Phase II, patient was sitting up in recliner chair and was sitting on a soaked chux pad (~500 mL).  As she stood, she had another large gush into the commode, total EBL in Phase II 1-1.5 L.  Reviewed with patient and spouse that bleeding from abnormally implanted pregnancy doesn't respond to medications  and she would likely need UAE +/- balloon tamponade and if conservative measures not successful, hysterectomy to control bleeding.  IR suite and staff contacted, patient brought to IR shortly thereafter to review consent and proceed with UAE.  Lisa Costello MD MPH

## 2018-08-31 NOTE — PROCEDURES
Interventional Radiology Brief Post Procedure Note    Procedure: IR UTERINE ARTERY NON FIBROID EMBO    Proceduralist: Dustin Hurley MD    Assistant: None    Time Out: Prior to the start of the procedure and with procedural staff participation, I verbally confirmed the patient s identity using two indicators, relevant allergies, that the procedure was appropriate and matched the consent or emergent situation, and that the correct equipment/implants were available. Immediately prior to starting the procedure I conducted the Time Out with the procedural staff and re-confirmed the patient s name, procedure, and site/side. (The Joint Commission universal protocol was followed.)  Yes    Medications   Medication Event Details Admin User Admin Time   lidocaine (PF) (XYLOCAINE) 1 % injection 1-30 mL Medication Given by Other Dose: 5 mL; Route: Subcutaneous Serg Dove RN 8/31/2018  1:06 PM   iodixanol (VISIPAQUE 320) injection 1-150 mL Medication Given by Other Dose: 62 mL; Route: INTRA-ARTERIAL; Scheduled Time: 12:15 PM Serg Dove RN 8/31/2018  1:06 PM   heparin 5,000 units in 0.9% sodium chloride 1000 mL Medication New Bag Dose: 500 Units; Route: TABLE SOLN Serg Dove RN 8/31/2018  1:07 PM       Sedation: Monitored Anesthesia Care (MAC) administered and documented by Anesthesia Care Provider    Findings: bilateral uterine artery embolization with gelfoam slurry.     Estimated Blood Loss: Less than 10 ml    Fluoroscopy Time:  minute(s)    SPECIMENS: None    Complications: 1. None     Condition: Stable    Plan: 3 hrs straight right leg bedrest    Comments: See dictated procedure note for full details.    Dustin Hurley MD

## 2018-08-31 NOTE — PROGRESS NOTES
Postoperative check  2018     Late entry 1600    S: Joann is doing okay. She complains of back pain and feels like her bladder is full. She is still tired. Still has crmaping pain. Has not tried anything to eat or drink.     O:   Patient Vitals for the past 6 hrs:   BP Temp Temp src Heart Rate Resp SpO2 Weight   18 1615 118/70 - - 71 16 100 % 131.6 kg (290 lb 3.2 oz)   18 1545 (!) 142/97 98.1  F (36.7  C) Oral 92 16 98 % -   18 1530 122/70 - - 64 14 100 % -   18 1515 117/79 - - 62 13 100 % -   18 1500 115/74 - - 64 19 98 % -   18 1445 110/69 - - 92 18 99 % -   18 1430 109/60 - - 73 15 100 % -   18 1415 104/67 - - 61 18 100 % -   18 1400 108/65 97.5  F (36.4  C) Axillary 63 24 100 % -   18 1345 106/52 - - 80 18 99 % -   18 1330 103/54 - - - 13 98 % -   18 1328 102/66 98.4  F (36.9  C) Axillary - 14 100 % -     Gen: lying in bed, responsive but appears tired  Heart: RRR  Lungs: breathing comfortably  Extr: cool, but generally well perfused  Perineum: minimal blood, 10 mL on Chux. Per RN Chux changed at 1.5h postop with 50 mL.     Straight catheterization for ~600 mL    A/P  38 year old now  POD#0 hysteroscopic resection of  scar ectopic complicated by postoperative hemorrhage, now also POD#0 UAE.     #) Postoperative   Recently moved to the floor  1600 lab draw delayed because of the timing of patient transport, but spoke with floor RN and he will help rearrange this  Bleeding seems to have decreased significantly. Vital signs are stable, no tachycardia or hypotension at this time, no ongoing bleeding that can be seen at this time  - Consider discharge tomorrow morning if she continues to meet postoperative goals.   - Still needs postoperative doxycycline, ordered.   - Pain control with oxycodone, acetaminophen. Hold NSAIDs until ensuring stable coags.   - 3h immobile period per IR, then regular movement.     #) DM2  MSSI  ordered, regular diet. Recently hyperglycemic, likely significant physiologic stress and she had some cookies in postop before bleeding started.     Loren Obando MD PGY4  OB/Gyn  8/31/2018, 5:13 PM    I personally examined and evaluated Joann Gann on 8/31/2018.  Overall, doing much better than immediately pre-UAE.  Reviewed course of events for the day with patient and spouse, questions answered.  Reviewed expectations for ongoing recovery.   I discussed the patient with Dr. Obando and agree with the presentation, exam and plan of care documented in this note with edits by me.   Plan repeat TVUS in 1-2 weeks, will likely need second hysteroscopic resection of pregnancy tissue given amount left when fluid deficit reached.  Lisa Costello MD MPH

## 2018-08-31 NOTE — DISCHARGE SUMMARY
Discharge Summary    Joann Gann MRN# [id number]   YOB: 1980 Age: 38 year old     Date of Admission:  2018  Date of Discharge:  2018   Admitting Physician:  Lisa Beal MD  Discharge Physician:  Lou Singleton MD  Discharging Service:  Gynecology     Primary Provider: Clinic, [facility]          Admission Diagnoses:      scar ectopic pregnancy  S/p Methotrexate x4 doses, retained tissue          Discharge Diagnosis:     Same as above, postoperative hemorrhage  Acute blood loss anemia secondary to surgical blood loss         Discharge Disposition:     Home         Procedures:     Hysteroscopic resection of  scar ectopic pregnancy  Uterine artery embolization          Medications Prior to Admission:     Metformin  Ferrous sulfate  Ibuprofen PRN          Discharge Medications:      Joann Grimes   Home Medication Instructions ERIC:[id number]    Printed on:18 190   Medication Information                      Blood Glucose Monitoring Suppl (BLOOD GLUCOSE MONITOR SYSTEM) w/Device KIT  Patient not on insulin. Frequency of testin/day. . Dispense test strips and lancets per pharmacist discretion.             Ferrous Sulfate (IRON) 325 (65 Fe) MG tablet  Take 325 mg by mouth             ferrous sulfate (IRON) 325 (65 Fe) MG tablet  Take 1 tablet (325 mg) by mouth daily (with breakfast)             ibuprofen (ADVIL/MOTRIN) 600 MG tablet  Take 1 tablet (600 mg) by mouth every 6 hours as needed for pain (mild)             INSULIN ADMIN SUPPLIES  Please dispense a glucose meter:  Per pharmacist discretion             metFORMIN (GLUCOPHAGE-XR) 500 MG 24 hr tablet  Take 1,000 mg by mouth                    Consultations:     Anesthesia  Urgent interventional radiology           Brief History of Illness:     Joann was admitted as a 38 year old  with a known  scar ectopic pregnancy. She had previously received  Methotrexate 4 doses, with declining bHCG but persistent pregnancy tissue at the scar confirmed on USG. She elected to pursue hysteroscopic resection . Consent was discussed, including infection, scar perforation, and heavy bleeding possibly requiring transfusion, artery embolization, or hysterectomy. She agreed to proceed.           Hospital Course:     Following the procedure, she was about to discharge to home when she had heavy bleeding. Coagulation studies were immediately drawn and an emergent Interventional Radiology consult was obtained. She went directly to  for a uterine artery embolization which proceeded without complication.    She was admitted tot [facility] for monitoring overnight. On POD#1, she was doing well and meeting goals for discharge. Her pain was minimal, she was tolerating a regular diet, voiding spontaneously, ambulating independently, and had minimal bleeding.     DC Hemoglobin was stable at 7.8 but was asymptomatic at this level. Plan to continue PO Fe.          Discharge Instructions and Follow-Up:   Discharge diet: Regular   Discharge activity: Pelvic rest for 2 weeks, no driving or alcohol for 24 hours after the procedure   Discharge follow-up: Blood draw for beta hcg on 9/7  Clinic follow-up visit in 1-2 weeks     Afua Trimble MD  OB/GYN, PGY3  09/01/18    The patient was seen and examined by me on the day of discharge.  I have reviewed and agree with the above note.    Lou Singleton MD, FACOG

## 2018-08-31 NOTE — ANESTHESIA POSTPROCEDURE EVALUATION
Patient: Joann Gann    Procedure(s):  Operatvie Hysteroscopy - Wound Class: II-Clean Contaminated    Diagnosis: Scar   Diagnosis Additional Information: No value filed.    Anesthesia Type:  MAC    Note:  Anesthesia Post Evaluation    Patient location during evaluation: Phase 2  Patient participation: Able to fully participate in evaluation  Level of consciousness: awake and alert  Pain management: adequate  Airway patency: patent  Cardiovascular status: acceptable  Respiratory status: acceptable  Hydration status: acceptable  PONV: none     Anesthetic complications: None    Comments:     Pt's post op course was uneventful in Phase 2 recovery until patient alaina to go to bathroom after discharge instructions given by nursing staff.   Pt was noted to have sudden and significant post op bleeding.   Dr. Costello and OB team were at the bedside.  Pt was immediately transferred to IR for emergent embolization.    Prior to this time, the pt was doing well, tolerating po and stable.  There were no associated anesthesia complications.            Dr. Teodora Barbour MD  Anesthesia  2018 @ 1200.        Last vitals:  Vitals:    18 0915 18 0930 18 0945   BP: 110/74 108/66 97/64   Pulse:      Resp:      Temp:      SpO2: 94% 96% 98%         Electronically Signed By: Teodora Barbour MD  2018  1:20 PM

## 2018-09-01 ENCOUNTER — OFFICE VISIT (OUTPATIENT)
Dept: INTERPRETER SERVICES | Facility: CLINIC | Age: 38
End: 2018-09-01
Payer: MEDICAID

## 2018-09-01 VITALS
RESPIRATION RATE: 18 BRPM | HEIGHT: 66 IN | DIASTOLIC BLOOD PRESSURE: 60 MMHG | BODY MASS INDEX: 47.09 KG/M2 | WEIGHT: 293 LBS | SYSTOLIC BLOOD PRESSURE: 117 MMHG | OXYGEN SATURATION: 98 % | HEART RATE: 87 BPM | TEMPERATURE: 97.8 F

## 2018-09-01 LAB
GLUCOSE BLDC GLUCOMTR-MCNC: 126 MG/DL (ref 70–99)
GLUCOSE BLDC GLUCOMTR-MCNC: 131 MG/DL (ref 70–99)
GLUCOSE BLDC GLUCOMTR-MCNC: 139 MG/DL (ref 70–99)
GLUCOSE BLDC GLUCOMTR-MCNC: 145 MG/DL (ref 70–99)
GLUCOSE BLDC GLUCOMTR-MCNC: 145 MG/DL (ref 70–99)
HGB BLD-MCNC: 7.8 G/DL (ref 11.7–15.7)

## 2018-09-01 PROCEDURE — 25000132 ZZH RX MED GY IP 250 OP 250 PS 637: Performed by: STUDENT IN AN ORGANIZED HEALTH CARE EDUCATION/TRAINING PROGRAM

## 2018-09-01 PROCEDURE — 25000128 H RX IP 250 OP 636: Performed by: STUDENT IN AN ORGANIZED HEALTH CARE EDUCATION/TRAINING PROGRAM

## 2018-09-01 PROCEDURE — 36415 COLL VENOUS BLD VENIPUNCTURE: CPT | Performed by: STUDENT IN AN ORGANIZED HEALTH CARE EDUCATION/TRAINING PROGRAM

## 2018-09-01 PROCEDURE — 85018 HEMOGLOBIN: CPT | Performed by: STUDENT IN AN ORGANIZED HEALTH CARE EDUCATION/TRAINING PROGRAM

## 2018-09-01 PROCEDURE — 00000146 ZZHCL STATISTIC GLUCOSE BY METER IP

## 2018-09-01 PROCEDURE — 25000128 H RX IP 250 OP 636: Performed by: RADIOLOGY

## 2018-09-01 PROCEDURE — T1013 SIGN LANG/ORAL INTERPRETER: HCPCS | Mod: U3

## 2018-09-01 RX ORDER — FERROUS SULFATE 325(65) MG
325 TABLET ORAL
Qty: 30 TABLET | Refills: 2 | Status: SHIPPED | OUTPATIENT
Start: 2018-09-01 | End: 2019-02-22

## 2018-09-01 RX ADMIN — OXYCODONE HYDROCHLORIDE 5 MG: 5 TABLET ORAL at 14:13

## 2018-09-01 RX ADMIN — ACETAMINOPHEN 975 MG: 325 TABLET, FILM COATED ORAL at 18:53

## 2018-09-01 RX ADMIN — INSULIN ASPART 1 UNITS: 100 INJECTION, SOLUTION INTRAVENOUS; SUBCUTANEOUS at 04:47

## 2018-09-01 RX ADMIN — INSULIN ASPART 1 UNITS: 100 INJECTION, SOLUTION INTRAVENOUS; SUBCUTANEOUS at 01:26

## 2018-09-01 RX ADMIN — SENNOSIDES AND DOCUSATE SODIUM 2 TABLET: 8.6; 5 TABLET ORAL at 09:13

## 2018-09-01 RX ADMIN — SODIUM CHLORIDE: 9 INJECTION, SOLUTION INTRAVENOUS at 04:46

## 2018-09-01 RX ADMIN — MEDROXYPROGESTERONE ACETATE 150 MG: 150 INJECTION, SUSPENSION, EXTENDED RELEASE INTRAMUSCULAR at 14:04

## 2018-09-01 RX ADMIN — ACETAMINOPHEN 975 MG: 325 TABLET, FILM COATED ORAL at 09:13

## 2018-09-01 RX ADMIN — ACETAMINOPHEN 975 MG: 325 TABLET, FILM COATED ORAL at 01:20

## 2018-09-01 ASSESSMENT — ACTIVITIES OF DAILY LIVING (ADL)
ADLS_ACUITY_SCORE: 9
ADLS_ACUITY_SCORE: 10
ADLS_ACUITY_SCORE: 9
ADLS_ACUITY_SCORE: 9

## 2018-09-01 ASSESSMENT — PAIN DESCRIPTION - DESCRIPTORS: DESCRIPTORS: CRAMPING

## 2018-09-01 NOTE — PLAN OF CARE
Problem: Patient Care Overview  Goal: Plan of Care/Patient Progress Review  Outcome: Improving  Nurisng  S- mod amt vag drainage in bed in am,  Since then only small scant amt on alexandra pad.  Voided good amt.  Some Rt abd discomfort.  ABD binder helps  Tyelonol, oxy 5mg given now ofr  Increase pain.  Has ibuprofen for home.   Pt dizzy in am, sitting BP ok.   Mild dizziness continues.    here all day w pt.    requests note from MD for his job.    came in am used alittle but pt states does understand much english and was ok w out using  rest of day.    See note re Rt leg weakness/ thigh pain  A-stable.   R- enc walk again   Plan for dc today

## 2018-09-01 NOTE — PROGRESS NOTES
Ob/Gyn Progress Note     S: Joann is doing okay this morning. Was able to sleep. Is not having any pain. Tolerating regular diet. Has not yet ambulated this AM. Is voiding without issue. Bleeding is minimal      O:   Vitals:    18 0100 18 0200 18 0451 18 0700   BP: 123/72      Pulse:       Resp: 24 21 17 19   Temp: 97.9  F (36.6  C)      TempSrc:       SpO2: 97% 97% 96% 95%   Weight:       Height:           Gen: Well appearing, in NAD  Heart:Regular rate   Lungs: breathing comfortably on RA  Abdomen: Soft, non distended, non tender.       A/P  38 year old now  POD#1 hysteroscopic resection of  scar ectopic complicated by postoperative hemorrhage, now also POD#1 UAE.     Postoperative   - Hgb dropped to 9.1 from 10.1. AM hgb 7.8. No symptoms of acute blood loss anemia. Bleeding seems to have decreased significantly. Vital signs are stable, no tachycardia or hypotension at this time, no ongoing bleeding that can be seen at this time. Discussed possibility of transfusion, however patient is not symptomatic.   - Pain controlled with oxycodone, acetaminophen. Hold NSAIDs until ensuring stable coags.   - DC today   - Will need outpatient eval of bHCG to ensure it is downtrending     DM2  - -203 overnight, requiring insulin  -MSSI ordered, regular diet.   - Will need outpatient eval of diabetes      Sondra Cardenas MD, MHS  Ob/Gyn PGY3  18     The patient was seen and examined by me separately from the team.  I have reviewed and agree with the above note.  She is doing well this morning, notes that her vaginal bleeding is minimal.  She is symptomatic from her ABLA but declines PRBC transfusion.  Discharge home today as above.    Hemoglobin   Date Value Ref Range Status   2018 7.8 (L) 11.7 - 15.7 g/dL Final   ]      Lou Singleton MD, FACOG

## 2018-09-01 NOTE — PLAN OF CARE
Problem: Patient Care Overview  Goal: Plan of Care/Patient Progress Review  Outcome: No Change  Note for the night shift.  D:Pt sleeping off and on tonight. Had visitors during the night, at the start of the night.  sleeping in the chair in her room. Alert and oriented. Able to speak and understand English. Right groin site is soft. Pedals are good. Has denied the need for pain meds all night tonight. Up to the commode with assist of 1. Good UOP. Urine is red with clots. Scant red on the chux under her.  Drinking without problems. Call light with in reach.Able to make needs known.   A: Doing OK.P: Monitor closely.  Supportive cares. Medicate for pain as needed.Encourage activity today.

## 2018-09-01 NOTE — PROVIDER NOTIFICATION
Nursing  S- Pt had been up in chair in am, ate fair brfst.  Voided good amt urine that was bloody- likely r/t vag drainage.  Napped  Woke pt to have her walk in brand- pt did walk brand w IV pole as support- pt felt Rt leg weak- like it wont hold her if she puts full weight on it.  Staets Rt thigh pain also.  No bruising Rt leg noted, Rt groin site ok  Pt was able to walk back from bathroom without IV pole.   A- ? R/t postioning during podcedure yest and/or IR procedure.    R- had been using only tyelonol for pain  Oxy 5mg given,  Heat to site,  Up in chair - enc lunch.  OB MD informed.    Try another walk after eating.  Monitor.  flip bernal

## 2018-09-01 NOTE — PLAN OF CARE
Problem: Patient Care Overview  Goal: Plan of Care/Patient Progress Review  Outcome: No Change  Note for the night shift.  D:

## 2018-09-02 NOTE — PLAN OF CARE
Problem: Patient Care Overview  Goal: Plan of Care/Patient Progress Review  Outcome: Adequate for Discharge Date Met: 09/01/18  Pt A&OX4. VSS. LS clear. Urinating adequate amount pink urine. Small amount of blood on pad before shower. Pt complains of some dizziness when getting up but improves with time. Education on moving slowly and drinking more fluids provided. SBA with walking. IR site CDI WDL. Still complaining of some right leg pain. Gets better with scheduled tylenol. Use of  used for discharge instruction packet and meds. Discussed with pt and spouse. Able to make needs known. Discharging home via car with .

## 2018-09-06 LAB — COPATH REPORT: NORMAL

## 2018-09-07 DIAGNOSIS — O00.80 OTHER ECTOPIC PREGNANCY WITHOUT INTRAUTERINE PREGNANCY: ICD-10-CM

## 2018-09-07 DIAGNOSIS — Z98.890 S/P D&C (STATUS POST DILATION AND CURETTAGE): ICD-10-CM

## 2018-09-07 LAB — B-HCG SERPL-ACNC: 17 IU/L (ref 0–5)

## 2018-09-07 PROCEDURE — 84702 CHORIONIC GONADOTROPIN TEST: CPT | Performed by: OBSTETRICS & GYNECOLOGY

## 2018-09-07 PROCEDURE — 36415 COLL VENOUS BLD VENIPUNCTURE: CPT | Performed by: OBSTETRICS & GYNECOLOGY

## 2018-09-14 ENCOUNTER — OFFICE VISIT (OUTPATIENT)
Dept: OBGYN | Facility: CLINIC | Age: 38
End: 2018-09-14
Attending: OBSTETRICS & GYNECOLOGY
Payer: MEDICAID

## 2018-09-14 DIAGNOSIS — O00.80 OTHER ECTOPIC PREGNANCY WITHOUT INTRAUTERINE PREGNANCY: ICD-10-CM

## 2018-09-14 DIAGNOSIS — O00.80 OTHER ECTOPIC PREGNANCY, UNSPECIFIED WHETHER INTRAUTERINE PREGNANCY PRESENT: Primary | ICD-10-CM

## 2018-09-14 DIAGNOSIS — Z98.890 STATUS POST HYSTEROSCOPIC ABLATION OF ENDOMETRIUM: Primary | ICD-10-CM

## 2018-09-14 PROCEDURE — T1013 SIGN LANG/ORAL INTERPRETER: HCPCS | Mod: U3,ZF

## 2018-09-14 NOTE — MR AVS SNAPSHOT
After Visit Summary   9/14/2018    Joann Gann    MRN: [id number]           Patient Information     Date Of Birth          1980        Visit Information        Provider Department      9/14/2018 12:45 PM Shelia Peña; Lisa Drake MD [facility]        Today's Diagnoses     Status post hysteroscopic ablation of endometrium    -  1    Other ectopic pregnancy without intrauterine pregnancy           Follow-ups after your visit        Additional Services     MAT FETAL MED CTR REFERRAL-PREGNANCY       There is no height or weight on file to calculate BMI.    >> Patient may proceed with recommendations for further testing as directed by the Maternal Fetal Medicine Specialist >>    >> If requesting Fetal Echo: MFM will determine appropriate location for exam due to indication.    >> If requesting Lung Maturity Amnio:  If results indicate fetal lung maturity, induction or C/S is recommended within 36 hours.  Please schedule accordingly.     Dear Patient:   Please be aware that coverage of these services is subject to the terms and limitations of your health insurance plan.  Call member services at your health plan with any benefit or coverage questions.      Please bring the following to your appointment:    >>  Any x-rays, CTs or MRIs which have been performed.  Contact the facility where they were done to arrange for  prior to your scheduled appointment.  Any new CT, MRI or other procedures ordered by your specialist must be performed at a [place] facility or coordinated by your clinic's referral office.  >>  List of current medications   >>  This referral request   >>  Any documents/labs given to you for this referral                  Your next 10 appointments already scheduled     Sep 18, 2018  2:15 PM CDT   MFM US OB TRANSVAGINAL with [id number]   [facility] Maternal Fetal Medicine Ultrasound [facility])    [address]  [facility] [address] [phone]           Wear comfortable clothes and leave your valuables at home.            Sep 18, 2018  2:45 PM CDT   Radiology MD with UR MYA GIL   [facility] [place] ([facility])    [address]  [facility] [address]   [phone]           Please arrive at the time given for your first appointment. This visit is used internally to schedule the physician's time during your ultrasound.              Who to contact     Please call your clinic at [phone] to:    Ask questions about your health    Make or cancel appointments    Discuss your medicines    Learn about your test results    Speak to your doctor            Additional Information About Your Visit        [employer] Information     DocuSign is an electronic gateway that provides easy, online access to your medical records. With DocuSign, you can request a clinic appointment, read your test results, renew a prescription or communicate with your care team.     To sign up for DocuSign visit the website at [email or web address]   You will be asked to enter the access code listed below, as well as some personal information. Please follow the directions to create your username and password.     Your access code is: [email or web address]  Expires: 2018  4:18 PM     Your access code will  in 90 days. If you need help or a new code, please contact your [facility] or call [phone] for assistance.        Care EveryWhere ID     This is your Care EveryWhere ID. This could be used by other organizations to access your [place] medical records  [id number]        Your Vitals Were     Last Period                   2018 (Approximate)            Blood Pressure from Last 3 Encounters:   18 117/60   18 116/79   18 133/84    Weight from Last 3 Encounters:   18 133.3 kg  (293 lb 12.8 oz)   08/30/18 131.5 kg (289 lb 12.8 oz)   08/21/18 130.6 kg (288 lb)              We Performed the Following     MAT FETAL MED CTR REFERRAL-PREGNANCY          Today's Medication Changes          These changes are accurate as of 9/14/18 11:59 PM.  If you have any questions, ask your nurse or doctor.               These medicines have changed or have updated prescriptions.        Dose/Directions    ferrous sulfate 325 (65 Fe) MG tablet   Commonly known as:  IRON   This may have changed:  Another medication with the same name was removed. Continue taking this medication, and follow the directions you see here.   Used for:  S/P D&C (status post dilation and curettage)   Changed by:  Pravin Beal, Lisa Sanchez MD        Dose:  325 mg   Take 1 tablet (325 mg) by mouth daily (with breakfast)   Quantity:  30 tablet   Refills:  2                Primary Care Provider Office Phone # Fax #    [facility] [phone]       [address] [place] [address]        Equal Access to Services     MACRINA TADEO AH: Hadii dania joseph hadasho Soomaali, waaxda luqadaha, qaybta kaalmada adeegyaalice, hayden bailey . So [facility] [phone].    ATENCIÓN: Si habla español, tiene a taylor disposición servicios gratuitos de asistencia lingüística. Llame al [phone].    We comply with applicable federal civil rights laws and [place] laws. We do not discriminate on the basis of race, color, national origin, age, disability, sex, sexual orientation, or gender identity.            Thank you!     Thank you for choosing [facility]  for your care. Our goal is always to provide you with excellent care. Hearing back from our patients is one way we can continue to improve our services. Please take a few minutes to complete the written survey that you may receive in the mail after your visit with us. Thank you!             Your Updated Medication List - Protect  others around you: Learn how to safely use, store and throw away your medicines at www.disposemymeds.org.          This list is accurate as of 18 11:59 PM.  Always use your most recent med list.                   Brand Name Dispense Instructions for use Diagnosis    Blood Glucose Monitor System w/Device Kit      Patient not on insulin. Frequency of testin/day. . Dispense test strips and lancets per pharmacist discretion.        ferrous sulfate 325 (65 Fe) MG tablet    IRON    30 tablet    Take 1 tablet (325 mg) by mouth daily (with breakfast)    S/P D&C (status post dilation and curettage)       ibuprofen 600 MG tablet    ADVIL/MOTRIN    30 tablet    Take 1 tablet (600 mg) by mouth every 6 hours as needed for pain (mild)    S/P D&C (status post dilation and curettage)       INSULIN ADMIN SUPPLIES      Please dispense a glucose meter:  Per pharmacist discretion        metFORMIN 500 MG 24 hr tablet    GLUCOPHAGE-XR     Take 1,000 mg by mouth

## 2018-09-14 NOTE — PROGRESS NOTES
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  She reports intermittent cramping and ongoing occ bleeding, sometimes with small clots but overall light.  Wondering about next steps.    Past Medical History  Past Medical History:   Diagnosis Date     Diabetes (H)     Type 2       Medications  Current Outpatient Prescriptions   Medication     Blood Glucose Monitoring Suppl (BLOOD GLUCOSE MONITOR SYSTEM) w/Device KIT     ferrous sulfate (IRON) 325 (65 Fe) MG tablet     ibuprofen (ADVIL/MOTRIN) 600 MG tablet     INSULIN ADMIN SUPPLIES     metFORMIN (GLUCOPHAGE-XR) 500 MG 24 hr tablet     No current facility-administered medications for this visit.        Allergies  No Known Allergies    Review of Systems  10 point ROS of systems including Constitutional, Eyes, Respiratory, Cardiovascular, Gastroenterology, Genitourinary, Integumentary, Muscularskeletal, Psychiatric were all negative except for pertinent positives noted in the HPI.    OBJECTIVE   LMP 2018 (Approximate)  General:  Alert, no distress   Head:  Normocephalic, without obvious abnormality   Lungs:  Clear to auscultation bilaterally   Heart:  Regular rate and rhythm, no murmur   Abdomen:  Obese, soft, non-tender, non-distended, bowel sounds normal   Pelvic: deferred   Extremities:  normal     Labs:   Hemoglobin   Date Value Ref Range Status   2018 7.8 (L) 11.7 - 15.7 g/dL Final   ]    Pathology:   Copath Report   Date Value Ref Range Status   2018   Final    Patient Name: JOANN GREENBERG  MR#: [id number]  Specimen #: [id number]  Collected: 2018  Received: 2018  Reported: 2018 10:54  Ordering Phy(s): HOME IRWIN    For improved result formatting, select 'View Enhanced Report Format' under   Linked Documents  "section.    SPECIMEN(S):  Uterine mass    FINAL DIAGNOSIS:    Uterine mass:  - Decidua, chorionic villi and necrotic fetal tissues, consistent with   products of conception    I have personally reviewed all specimens and/or slides, including the   listed special stains, and used them  with my medical judgement to determine or confirm the final diagnosis.    Electronically signed out by:    Nickie Hand M.D., PhD, [facility]    CLINICAL HISTORY:    38 year old  withcesarean scar pregnancy, nows/p high dose   methotrexate treatment with decreasing bHCG  and a recent US showing gestational sac and embryow/o cardiac activity    GROSS:  A: The specimen is received in formalin in a mesh bag with proper patient   identification, labeled \"uterine  mass\".  The specimen consists of a full segment of tan-red soft tissue   aggregating to 4.0 x 4.5 x 2.0 cm.  It  is wrapped and entirely submitted in cassettes A1-A4. (Dictated by:   Darlene MCGUIRE [facility] 2018 11:36 AM)    MICROSCOPIC:    Microscopic examination was performed.    CPT Codes:  A: 93147-PS3    TESTING LAB LOCATION:  [address] [address] [phone]    COLLECTION SITE:  Client: [facility]  Location: [facility] (B)    Resident  IXS1     ]    ASSESSMENT   Joann Gann is a 38 year old , postop.    PLAN   1. Reviewed pathology, continued expectations for recovery including really only able to resect ~50% of pregnancy tissue before fluid deficit reached  2.  Plan repeat TVUS with doppler flow next week, possible repeat attempt at resection based on imaging    Total visit time was 15 minutes with >50% time spent in counseling and coordination of care for continued management of CSP.  Lisa Costello MD MPH                    "

## 2018-09-14 NOTE — LETTER
2018     RE: Joann Gann  [address]  [facility] [address]     Dear Colleague,    Thank you for referring your patient, Joann Gann, to the [facility] at [facility]. Please see a copy of my visit note below.    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  She reports intermittent cramping and ongoing occ bleeding, sometimes with small clots but overall light.  Wondering about next steps.    Past Medical History  Past Medical History:   Diagnosis Date     Diabetes (H)     Type 2       Medications  Current Outpatient Prescriptions   Medication     Blood Glucose Monitoring Suppl (BLOOD GLUCOSE MONITOR SYSTEM) w/Device KIT     ferrous sulfate (IRON) 325 (65 Fe) MG tablet     ibuprofen (ADVIL/MOTRIN) 600 MG tablet     INSULIN ADMIN SUPPLIES     metFORMIN (GLUCOPHAGE-XR) 500 MG 24 hr tablet     No current facility-administered medications for this visit.        Allergies  No Known Allergies    Review of Systems  10 point ROS of systems including Constitutional, Eyes, Respiratory, Cardiovascular, Gastroenterology, Genitourinary, Integumentary, Muscularskeletal, Psychiatric were all negative except for pertinent positives noted in the HPI.    OBJECTIVE   LMP 2018 (Approximate)  General:  Alert, no distress   Head:  Normocephalic, without obvious abnormality   Lungs:  Clear to auscultation bilaterally   Heart:  Regular rate and rhythm, no murmur   Abdomen:  Obese, soft, non-tender, non-distended, bowel sounds normal   Pelvic: deferred   Extremities:  normal     Labs:   Hemoglobin   Date Value Ref Range Status   2018 7.8 (L) 11.7 - 15.7 g/dL Final   ]    Pathology:   Copath Report   Date Value Ref Range Status   2018    "Final    Patient Name: NOEMY GREENBERG  MR#: [id number]  Specimen #: [id number]  Collected: 2018  Received: 2018  Reported: 2018 10:54  Ordering Phy(s): HOME IRWIN    For improved result formatting, select 'View Enhanced Report Format' under   Linked Documents section.    SPECIMEN(S):  Uterine mass    FINAL DIAGNOSIS:    Uterine mass:  - Decidua, chorionic villi and necrotic fetal tissues, consistent with   products of conception    I have personally reviewed all specimens and/or slides, including the   listed special stains, and used them  with my medical judgement to determine or confirm the final diagnosis.    Electronically signed out by:    Nickie Hand M.D., PhD, [facility]    CLINICAL HISTORY:    38 year old  withcesarean scar pregnancy, nows/p high dose   methotrexate treatment with decreasing bHCG  and a recent US showing gestational sac and embryow/o cardiac activity    GROSS:  A: The specimen is received in formalin in a mesh bag with proper patient   identification, labeled \"uterine  mass\".  The specimen consists of a full segment of tan-red soft tissue   aggregating to 4.0 x 4.5 x 2.0 cm.  It  is wrapped and entirely submitted in cassettes A1-A4. (Dictated by:   Darlene MCGUIRE [facility] 2018 11:36 AM)    MICROSCOPIC:    Microscopic examination was performed.    CPT Codes:  A: 11192-EC6    TESTING LAB LOCATION:  [address] [address] [phone]    COLLECTION SITE:  Client: [facility]  Location: [facility] (B)    Resident  IXS1     ]    ASSESSMENT   Noemy Gann is a 38 year old , postop.    PLAN   1. Reviewed pathology, continued expectations for recovery including really only able to resect ~50% of pregnancy tissue before fluid deficit reached  2.  Plan repeat TVUS with doppler flow next week, possible repeat attempt " at resection based on imaging    Total visit time was 15 minutes with >50% time spent in counseling and coordination of care for continued management of CSP.  Lisa Costello MD MPH

## 2018-09-18 ENCOUNTER — HOSPITAL ENCOUNTER (OUTPATIENT)
Dept: ULTRASOUND IMAGING | Facility: CLINIC | Age: 38
Discharge: HOME | End: 2018-09-18
Attending: OBSTETRICS & GYNECOLOGY | Admitting: OBSTETRICS & GYNECOLOGY
Payer: MEDICAID

## 2018-09-18 ENCOUNTER — OFFICE VISIT (OUTPATIENT)
Dept: MATERNAL FETAL MEDICINE | Facility: CLINIC | Age: 38
End: 2018-09-18
Attending: OBSTETRICS & GYNECOLOGY
Payer: MEDICAID

## 2018-09-18 DIAGNOSIS — O00.80 OTHER ECTOPIC PREGNANCY, UNSPECIFIED WHETHER INTRAUTERINE PREGNANCY PRESENT: ICD-10-CM

## 2018-09-18 DIAGNOSIS — O00.80 OTHER ECTOPIC PREGNANCY, UNSPECIFIED WHETHER INTRAUTERINE PREGNANCY PRESENT: Primary | ICD-10-CM

## 2018-09-18 PROCEDURE — 76817 TRANSVAGINAL US OBSTETRIC: CPT

## 2018-09-18 NOTE — PROGRESS NOTES
"Please see \"Imaging\" tab under \"Chart Review\" for details of today's US at the [facility].    Osiel Roberson MD  Maternal-Fetal Medicine      "

## 2018-09-18 NOTE — MR AVS SNAPSHOT
After Visit Summary   9/18/2018    Joann Gann    MRN: [id number]           Patient Information     Date Of Birth          1980        Visit Information        Provider Department      9/18/2018 2:45 PM Osiel Roberson MD [facility] Maternal Fetal Medicine [facility]        Today's Diagnoses     Other ectopic pregnancy, unspecified whether intrauterine pregnancy present    -  1       Follow-ups after your visit        Who to contact     If you have questions or need follow up information about today's clinic visit or your schedule please contact [facility] MATERNAL FETAL MEDICINE [facility] directly at [phone].  Normal or non-critical lab and imaging results will be communicated to you by MyChart, letter or phone within 4 business days after the clinic has received the results. If you do not hear from us within 7 days, please contact the clinic through MyChart or phone. If you have a critical or abnormal lab result, we will notify you by phone as soon as possible.  Submit refill requests through [employer] or call your pharmacy and they will forward the refill request to us. Please allow 3 business days for your refill to be completed.          Additional Information About Your Visit        Care EveryWhere ID     This is your Care EveryWhere ID. This could be used by other organizations to access your [place] medical records  [id number]        Your Vitals Were     Last Period                   06/05/2018 (Approximate)            Blood Pressure from Last 3 Encounters:   09/01/18 117/60   08/30/18 116/79   08/23/18 133/84    Weight from Last 3 Encounters:   09/01/18 133.3 kg (293 lb 12.8 oz)   08/30/18 131.5 kg (289 lb 12.8 oz)   08/21/18 130.6 kg (288 lb)              Today, you had the following     No orders found for display       Primary Care Provider Office Phone # Fax #    [facility] [phone]       [address] [facility] [address]         Equal Access to Services     [facility] Hospital[facility]BI : Hadii aad ku hadeunicehawk Aliciaali, wayehudada luqlianaha, qabenjyta christenbhargavhayden cristina. So [facility] [phone].    ATENCIÓN: Si habla arturo, tiene a taylor disposición servicios gratuitos de asistencia lingüística. Uzielame al [phone].    We comply with applicable federal civil rights laws and [place] laws. We do not discriminate on the basis of race, color, national origin, age, disability, sex, sexual orientation, or gender identity.            Thank you!     Thank you for choosing [facility]  for your care. Our goal is always to provide you with excellent care. Hearing back from our patients is one way we can continue to improve our services. Please take a few minutes to complete the written survey that you may receive in the mail after your visit with us. Thank you!             Your Updated Medication List - Protect others around you: Learn how to safely use, store and throw away your medicines at www.disposemymeds.org.          This list is accurate as of 18  2:58 PM.  Always use your most recent med list.                   Brand Name Dispense Instructions for use Diagnosis    Blood Glucose Monitor System w/Device Kit      Patient not on insulin. Frequency of testin/day. . Dispense test strips and lancets per pharmacist discretion.        ferrous sulfate 325 (65 Fe) MG tablet    IRON    30 tablet    Take 1 tablet (325 mg) by mouth daily (with breakfast)    S/P D&C (status post dilation and curettage)       ibuprofen 600 MG tablet    ADVIL/MOTRIN    30 tablet    Take 1 tablet (600 mg) by mouth every 6 hours as needed for pain (mild)    S/P D&C (status post dilation and curettage)       INSULIN ADMIN SUPPLIES      Please dispense a glucose meter:  Per pharmacist discretion        metFORMIN 500 MG 24 hr tablet    GLUCOPHAGE-XR     Take 1,000 mg by mouth

## 2018-09-26 ENCOUNTER — TELEPHONE (OUTPATIENT)
Dept: OBGYN | Facility: CLINIC | Age: 38
End: 2018-09-26

## 2018-09-26 DIAGNOSIS — O00.80 OTHER ECTOPIC PREGNANCY WITHOUT INTRAUTERINE PREGNANCY: Primary | ICD-10-CM

## 2018-09-26 NOTE — TELEPHONE ENCOUNTER
Called and LVM with aid of  regarding Joann's follow up ultrasound.  Given overall reassuring appearance on ultrasound 9/18/18, recommended continued observation for now of her CSP s/p medical mgmt and hysteroscopic resection complicated by hemorrhage requiring UAE.  Recommened follow up in 6 months and prevention of pregnancy during that time.  Encouraged her to call back and schedule and let us know if she had any contraceptive needs as well.  MD Alicia

## 2019-02-22 ENCOUNTER — OFFICE VISIT (OUTPATIENT)
Dept: OBGYN | Facility: CLINIC | Age: 39
End: 2019-02-22
Payer: COMMERCIAL

## 2019-02-22 VITALS
HEART RATE: 97 BPM | WEIGHT: 282 LBS | BODY MASS INDEX: 45.52 KG/M2 | SYSTOLIC BLOOD PRESSURE: 139 MMHG | DIASTOLIC BLOOD PRESSURE: 87 MMHG

## 2019-02-22 DIAGNOSIS — O00.80 OTHER ECTOPIC PREGNANCY WITHOUT INTRAUTERINE PREGNANCY: ICD-10-CM

## 2019-02-22 DIAGNOSIS — N93.9 ABNORMAL UTERINE BLEEDING: Primary | ICD-10-CM

## 2019-02-22 LAB
B-HCG SERPL-ACNC: <1 IU/L (ref 0–5)
ERYTHROCYTE [DISTWIDTH] IN BLOOD BY AUTOMATED COUNT: 15.1 % (ref 10–15)
HCT VFR BLD AUTO: 29.5 % (ref 35–47)
HGB BLD-MCNC: 9.3 G/DL (ref 11.7–15.7)
MCH RBC QN AUTO: 20.9 PG (ref 26.5–33)
MCHC RBC AUTO-ENTMCNC: 31.5 G/DL (ref 31.5–36.5)
MCV RBC AUTO: 66 FL (ref 78–100)
PLATELET # BLD AUTO: 317 10E9/L (ref 150–450)
RBC # BLD AUTO: 4.45 10E12/L (ref 3.8–5.2)
TSH SERPL DL<=0.005 MIU/L-ACNC: 2.73 MU/L (ref 0.4–4)
WBC # BLD AUTO: 8.2 10E9/L (ref 4–11)

## 2019-02-22 PROCEDURE — 85027 COMPLETE CBC AUTOMATED: CPT | Performed by: OBSTETRICS & GYNECOLOGY

## 2019-02-22 PROCEDURE — 36415 COLL VENOUS BLD VENIPUNCTURE: CPT | Performed by: OBSTETRICS & GYNECOLOGY

## 2019-02-22 PROCEDURE — 84702 CHORIONIC GONADOTROPIN TEST: CPT | Performed by: OBSTETRICS & GYNECOLOGY

## 2019-02-22 PROCEDURE — G0463 HOSPITAL OUTPT CLINIC VISIT: HCPCS

## 2019-02-22 PROCEDURE — 84443 ASSAY THYROID STIM HORMONE: CPT | Performed by: OBSTETRICS & GYNECOLOGY

## 2019-02-22 RX ORDER — ACETAMINOPHEN AND CODEINE PHOSPHATE 120; 12 MG/5ML; MG/5ML
0.35 SOLUTION ORAL DAILY
Qty: 84 TABLET | Refills: 3 | Status: SHIPPED | OUTPATIENT
Start: 2019-02-22 | End: 2019-04-26

## 2019-02-22 ASSESSMENT — PAIN SCALES - GENERAL: PAINLEVEL: NO PAIN (0)

## 2019-02-22 NOTE — PATIENT INSTRUCTIONS
Please go to the lab for blood tests. A prescription was sent for the mini pill to the pharmacy. Please schedule a follow-up visit with ultrasound.

## 2019-02-22 NOTE — PROGRESS NOTES
"[facility]  Gynecology Visit    HPI:    Joann Gann is a 38 year old  here for follow-up after a  scar ectopic pregnancy at the end of 2018 for which she received 4 doses of methotrexate and ultimately underwent hysteroscopic resection complicated by hemorrhage that required UAE. She received a dose of depo provera at the time of discharge. She had a follow-up ultrasound on 19 that showed an empty uterus. She was instructed to follow-up in 6 months.    Today, Ms. Homar Gann reports abnormal uterine bleeding. She went three months without any bleeding following her procedure. She then had eight weeks of daily bleeding that varied in amount of bleeding. Some days were very heavy, during which time she passed palm-sized clots. She had associated cramping and \"stomach\" pain during the heavy bleeding days. She has felt tired but denies lightheadedness, dizziness, or palpitations. Ms. Gann was treated for a yeast infection one month ago. She started having a similar, thick white vaginal discharge yesterday and was prescribed mono-stat. She has already noticed an improvement in her itching, burning, and discharge.     PMH:  Past Medical History:   Diagnosis Date     Diabetes (H)     Type 2     PSH:  Past Surgical History:   Procedure Laterality Date      SECTION  , ,      DILATION AND CURETTAGE, OPERATIVE HYSTEROSCOPY WITH MORCELLATOR, COMBINED N/A 2018    Procedure: COMBINED DILATION AND CURETTAGE, OPERATIVE HYSTEROSCOPY WITH MORCELLATOR;  Operative Hysteroscopy;  Surgeon: Lisa Drake MD;  Location: [facility]     Uterine artery embolization Bilateral 2018    Emergent after partial resection of CSP     Soc Hx: Nonsmoker. No alcohol. No drug use.       ROS: 10-Point ROS negative except as noted in HPI    Physical Exam  /87   Pulse 97   Wt 127.9 kg (282 lb)   LMP 2018 (Approximate)   Breastfeeding? No   BMI 45.52 kg/m  "   Body mass index is 45.52 kg/m .    Gen: alert, oriented, no acute distress  Resp: breathing comfortably on room air  Abd: soft, nondistended, tender to palpation of entire lower abdomen from RLQ to LLQ without rebound tenderness or guarding.  Pelvic: Bimanual exam limited by patient body habitus but reported discomfort with the entire exam, no specifically increased tenderness with palpation of uterus or adnexa. No discharge on exam glove following exam.     Assessment/Plan:  Joann Gann is a 38 year old  female here for follow-up after a  scar ectopic pregnancy in 2018 and to evaluate abnormal uterine bleeding.    # scar ectopic pregnancy. S/p 4 doses methotrexate and hysteroscopic resection (18) complicated by hemorrhage secondary to surgical blood loss requiring UAE. Reassuring ultrasound on 18 showed an empty uterus. Urine pregnancy test negative today. Tenderness to palpation of uterine fundus on bimanual exam today, which warrants a repeat TVUS.    #Abnormal uterine bleeding. Patient had eight weeks of heavy vaginal bleeding that started 2 months after the hysteroscopy for the CSP. She received a dose of depo provera during her hospitalization, so the delay in return of menses is not unexpected. The duration and heaviness of the bleeding, however, is unexpected. TSH and CBC drawn today. A TVUS is also indicated given this recent episode of heavy bleeding. Patient is not currently on any hormonal management of her bleeding. Discussed hormonal options for managing heavy bleeding. She is not a good candidate for estrogen therapy given her age and history of migraines (unclear if she has auras based on patient's description of headaches). Discussed depo provera, nexplanon (patient had previously and does not want again), and Mirena IUD (patient very opposed to having a device inserted). She is interested in trying the mini-pill at this time. Prescription given for  micronor. This will hopefully thin the lining of the uterus to aide in visualization during her TVUS to see if any underlying pathology needs to be addressed. This will also give another chance to evaluate the area of the  scar.    Patient will need to be contacted by telephone with a  if her CBC or TSH are abnormal. She will follow-up with a TVUS and appointment with one of the OB/GYN MDs following the ultrasound.    Afua Trimble MD  OB/GYN, PGY3  19      The Patient was seen in [facility] by AFUA TRIMBLE.  I reviewed the history & exam. Assessment and plan were jointly made.    Shelia Greene MD

## 2019-02-22 NOTE — LETTER
[facility]  [place] [facility] [address]  [address]  [address]  [facility] [id number] [phone]          February 22, 2019    RE:  Joann Gann                                                                                                                                                       [address]   [facility] [address]            To whom it may concern:    Carroll Valenzuela accompanied his wife, Joann Gann, to a medical appointment on 2/22/19. Please excuse him from work during this time. Thank you.    Sincerely,      Afua Trimble MD  OB/GYN Resident

## 2019-02-25 ENCOUNTER — TELEPHONE (OUTPATIENT)
Dept: OBGYN | Facility: CLINIC | Age: 39
End: 2019-02-25

## 2019-02-25 ENCOUNTER — ANCILLARY PROCEDURE (OUTPATIENT)
Dept: ULTRASOUND IMAGING | Facility: CLINIC | Age: 39
End: 2019-02-25
Attending: OBSTETRICS & GYNECOLOGY
Payer: COMMERCIAL

## 2019-02-25 DIAGNOSIS — N93.9 ABNORMAL UTERINE BLEEDING: ICD-10-CM

## 2019-02-25 PROCEDURE — 76830 TRANSVAGINAL US NON-OB: CPT

## 2019-02-25 NOTE — TELEPHONE ENCOUNTER
Pt and significant other, Carroll Valenzuela, were in clinic today for ultrasound and requested a letter stating such. This was done.

## 2019-02-25 NOTE — LETTER
[facility]  [place] [facility] [address]  [address]  [address]  [facility] [address]  [phone]  [phone]              Joann Gann  [address]   [facility] [id number]  : 1980  MRN:  [id number]      2019          To Whom It May Concern:    Joann had a clinic appointment today at 3:30pm and she was accompanied by Carroll Valenzuela.    Sincerely,        Rosemary Fu RN on behalf of Lisa Costello MD

## 2019-02-26 ENCOUNTER — TELEPHONE (OUTPATIENT)
Dept: OBGYN | Facility: CLINIC | Age: 39
End: 2019-02-26

## 2019-02-26 DIAGNOSIS — D50.8 OTHER IRON DEFICIENCY ANEMIA: Primary | ICD-10-CM

## 2019-02-26 RX ORDER — FERROUS SULFATE 325(65) MG
325 TABLET ORAL
Qty: 90 TABLET | Refills: 3 | Status: SHIPPED | OUTPATIENT
Start: 2019-02-26 | End: 2019-04-26

## 2019-02-26 NOTE — TELEPHONE ENCOUNTER
Spoke with Estrellita with  and discussed low hemoglobin. She would like supplement to be sent to [facility]. Also discussed need to follow up on US results due to possible polyp. Did not discuss need for embx but helped to schedule follow up with MD to go over results. Rx sent.

## 2019-03-07 ENCOUNTER — OFFICE VISIT (OUTPATIENT)
Dept: OBGYN | Facility: CLINIC | Age: 39
End: 2019-03-07
Attending: OBSTETRICS & GYNECOLOGY
Payer: COMMERCIAL

## 2019-03-07 VITALS
HEIGHT: 66 IN | BODY MASS INDEX: 45.64 KG/M2 | DIASTOLIC BLOOD PRESSURE: 68 MMHG | SYSTOLIC BLOOD PRESSURE: 123 MMHG | WEIGHT: 284 LBS | HEART RATE: 87 BPM

## 2019-03-07 DIAGNOSIS — N93.9 ABNORMAL UTERINE BLEEDING: Primary | ICD-10-CM

## 2019-03-07 LAB
ERYTHROCYTE [DISTWIDTH] IN BLOOD BY AUTOMATED COUNT: 14.8 % (ref 10–15)
HCT VFR BLD AUTO: 30.5 % (ref 35–47)
HGB BLD-MCNC: 8.9 G/DL (ref 11.7–15.7)
MCH RBC QN AUTO: 20.5 PG (ref 26.5–33)
MCHC RBC AUTO-ENTMCNC: 29.2 G/DL (ref 31.5–36.5)
MCV RBC AUTO: 70 FL (ref 78–100)
PLATELET # BLD AUTO: 304 10E9/L (ref 150–450)
RBC # BLD AUTO: 4.35 10E12/L (ref 3.8–5.2)
WBC # BLD AUTO: 7.8 10E9/L (ref 4–11)

## 2019-03-07 PROCEDURE — G0463 HOSPITAL OUTPT CLINIC VISIT: HCPCS | Mod: ZF

## 2019-03-07 PROCEDURE — 85027 COMPLETE CBC AUTOMATED: CPT | Performed by: OBSTETRICS & GYNECOLOGY

## 2019-03-07 PROCEDURE — 36415 COLL VENOUS BLD VENIPUNCTURE: CPT | Performed by: OBSTETRICS & GYNECOLOGY

## 2019-03-07 RX ORDER — ACETAMINOPHEN 325 MG/1
975 TABLET ORAL ONCE
Status: CANCELLED | OUTPATIENT
Start: 2019-03-07 | End: 2019-03-07

## 2019-03-07 RX ORDER — MEDROXYPROGESTERONE ACETATE 10 MG
TABLET ORAL
Qty: 18 TABLET | Refills: 0 | Status: SHIPPED | OUTPATIENT
Start: 2019-03-07 | End: 2019-04-26

## 2019-03-07 RX ORDER — FERROUS SULFATE 325(65) MG
325 TABLET ORAL
Qty: 60 TABLET | Refills: 3 | Status: SHIPPED | OUTPATIENT
Start: 2019-03-07

## 2019-03-07 ASSESSMENT — ANXIETY QUESTIONNAIRES
5. BEING SO RESTLESS THAT IT IS HARD TO SIT STILL: NOT AT ALL
2. NOT BEING ABLE TO STOP OR CONTROL WORRYING: NOT AT ALL
3. WORRYING TOO MUCH ABOUT DIFFERENT THINGS: SEVERAL DAYS
6. BECOMING EASILY ANNOYED OR IRRITABLE: SEVERAL DAYS
7. FEELING AFRAID AS IF SOMETHING AWFUL MIGHT HAPPEN: MORE THAN HALF THE DAYS
GAD7 TOTAL SCORE: 6
1. FEELING NERVOUS, ANXIOUS, OR ON EDGE: SEVERAL DAYS

## 2019-03-07 ASSESSMENT — MIFFLIN-ST. JEOR: SCORE: 1979.97

## 2019-03-07 ASSESSMENT — PATIENT HEALTH QUESTIONNAIRE - PHQ9
5. POOR APPETITE OR OVEREATING: SEVERAL DAYS
SUM OF ALL RESPONSES TO PHQ QUESTIONS 1-9: 6

## 2019-03-07 NOTE — PATIENT INSTRUCTIONS
You were seen for abnormal uterine bleeding  Go to lab today for a blood draw. If your blood level is concerning you will be called and directed to go to the emergency department  Go to pharmacy.  the Provera which you can start tonight. Take 1 tab 3 times a day for three days, 1 tab twice daily for three days, and then 1 tab daily for three days. You will have bleeding when these pills stop.  Our surgery scheduler will call you to schedule the hysteroscopy dilation and curettage.     _________________________      Te vieron por un sangrado uterino anormal  Ve al laboratorio hoy para adan extracción de martell. Si taylor nivel de martell está relacionado con usted, se le llamará y se le indicará que vaya al departamento de emergencias.  Ir a la farmacia Recoge el Provera que puedes comenzar esta noche. Limon 1 tab 3 veces al día fausto lucius días, 1 tab dos veces al día fausto lucius días y luego 1 tab diariamente fausto lucius días. Tendrá sangrado cuando dejen de michaela estas pastillas.  Nuestro programador de cirugías lo llamará para programar la dilatación y el legrado de histeroscopia.

## 2019-03-07 NOTE — LETTER
3/7/2019       RE: Joann Gann  [address]  [place] [address]     Dear Colleague,    Thank you for referring your patient, Joann Gann, to the [facility] at [facility]. Please see a copy of my visit note below.    Gynecology Clinic Note/ Pre-op H&P    In person  present for visit.    Joann Gann is a 38 year old  here for follow-up of US results follow AUB. She has a recent  scar ectopic pregnancy at the end of 2018 for which she received 4 doses of methotrexate and ultimately underwent hysteroscopic resection complicated by hemorrhage that required UAE. She received a dose of depo provera at the time of discharge. She had a follow-up ultrasound on 19 that showed an empty uterus. She was then seen 19 and reported eight weeks 8 daily bleeding with some days passing palm-sized clots.   TVUS as below. Hgb(19) 9.3  Started bleeding again 4 days ago, and heavy bleeding since, using large pads, changing every 45min to an hour. Denies tachycardia, some dizziness but does no feel like she will pass out. Has not tried the POP pills or picked up the iron recommended.     TVUS (19)  Uterine findings:              Presence: Visible Size: Normal 6.0 x 7.0 x 5.2 cm.  Endometrium = 19.19 mm. ?Possible polyp              Cx length = 45.3 mm.                 Flexion:  Anteverted    Position: Midline          Margins: Smooth         Shape: Normal              Contour: Regular        Texture: Homogeneous          Cavity: Abnormal        Masses: Normal     Pelvic findings:               Right Adnexa: Normal              Left Adnexa: Normal              Bladder:  Normal                                                           Cul - de - sac fluid: None     Ovarian follicles:              Right ovary:  4.5 x 4.0 x 3.4cm.              1 cyst               Size(s):  3.5 x 3.1  "x 2.3              Left ovary:  2.7 x 2.5 x 3.9cm.              0 follicles     Comments:  Heterogenous endometrial cavity measuring 19mm with possible polyp. Recommend endometrial sampling. Simple right ovarian cyst measuring 3.5cm.     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       No Known Allergies    Social History     Tobacco Use     Smoking status: Never Smoker     Smokeless tobacco: Never Used   Substance Use Topics     Alcohol use: No     Drug use: No     No family history on file.     ROS: A complete 10 point ROS was conducted and was negative aside from that noted in the HPI    O: /68   Pulse 87   Ht 1.676 m (5' 6\")   Wt 128.8 kg (284 lb)   LMP 2018 (Approximate)   BMI 45.84 kg/m      Gen: sitting up, NAD  Cardiac: RRR, no m/r/g  Pulm: CTAB throughout posterior lung fields  Abd: soft, obese, nontender  Pelvic: Normal external genitalia and hair distribution. Vaginal mucosa pink and well " rugated. Moderate blood in vault, no clots in vault. Cervix visually closed, minimal active bleeding from os.    A/P: 38 year old   with recent CS scar ectopic pregnancy, resolved, now with AUB, thickened endometrial stripe with possible polyp. Discussed options of endometrial biopsy with repeat imaging in  1mo or hysteroscopy D&C with polypectomy. Recommend OR option due to continued heavy bleeding, she is nervous given hemorrhage and UAE s/p hysteroscopy for CS scar ectopic but thinks this is the best option. Did discuss placement of Mirena IUD at time of hysteroscopy D&C which would treat AUB if not caused by polyp, but she confirmed her previous desire to not have Mirena IUD  - Ferrous sulfate sent to pharmacy here  - Provera taper - 10mg TID x3d, 10mg BID x3d, 10mg daily for 3d. Counseled will have bleeding again when these pills are done  - CBC now. Resulted prior to writing this note and 8.9, similar to 9.3 of 2 weeks ago. Vitals are reassuring at this time so I do not think sending to ED for acute bleeding is necessary    Patient staffed with Dr Gil Kumar MD PGY3  03/07/19     The Patient was seen in [facility] by Dr. Kumar.   I reviewed the history & exam. Assessment and plan were jointly made.       Luzma Cordero MD, MPH

## 2019-03-07 NOTE — PROGRESS NOTES
Gynecology Clinic Note/ Pre-op H&P    In person  present for visit.    Joann Gann is a 38 year old  here for follow-up of US results follow AUB. She has a recent  scar ectopic pregnancy at the end of 2018 for which she received 4 doses of methotrexate and ultimately underwent hysteroscopic resection complicated by hemorrhage that required UAE. She received a dose of depo provera at the time of discharge. She had a follow-up ultrasound on 19 that showed an empty uterus. She was then seen 19 and reported eight weeks 8 daily bleeding with some days passing palm-sized clots.   TVUS as below. Hgb(19) 9.3  Started bleeding again 4 days ago, and heavy bleeding since, using large pads, changing every 45min to an hour. Denies tachycardia, some dizziness but does no feel like she will pass out. Has not tried the POP pills or picked up the iron recommended.     TVUS (19)  Uterine findings:              Presence: Visible Size: Normal 6.0 x 7.0 x 5.2 cm.  Endometrium = 19.19 mm. ?Possible polyp              Cx length = 45.3 mm.                 Flexion:  Anteverted    Position: Midline          Margins: Smooth         Shape: Normal              Contour: Regular        Texture: Homogeneous          Cavity: Abnormal        Masses: Normal     Pelvic findings:               Right Adnexa: Normal              Left Adnexa: Normal              Bladder:  Normal                                                           Cul - de - sac fluid: None     Ovarian follicles:              Right ovary:  4.5 x 4.0 x 3.4cm.              1 cyst               Size(s):  3.5 x 3.1 x 2.3              Left ovary:  2.7 x 2.5 x 3.9cm.              0 follicles     Comments:  Heterogenous endometrial cavity measuring 19mm with possible polyp. Recommend endometrial sampling. Simple right ovarian cyst measuring 3.5cm.     Past Medical History:   Diagnosis Date     Diabetes (H)     Type 2  "  Migraines with aura     Past Surgical History:   Procedure Laterality Date      SECTION  , ,      DILATION AND CURETTAGE, OPERATIVE HYSTEROSCOPY WITH MORCELLATOR, COMBINED N/A 2018    Procedure: COMBINED DILATION AND CURETTAGE, OPERATIVE HYSTEROSCOPY WITH MORCELLATOR;  Operative Hysteroscopy;  Surgeon: Lisa Drake MD;  Location: [facility]     Uterine artery embolization Bilateral 2018    Emergent after partial resection of CSP     Current Outpatient Medications   Medication     ferrous sulfate (FEROSUL) 325 (65 Fe) MG tablet     medroxyPROGESTERone (PROVERA) 10 MG tablet     metFORMIN (GLUCOPHAGE-XR) 500 MG 24 hr tablet     Blood Glucose Monitoring Suppl (BLOOD GLUCOSE MONITOR SYSTEM) w/Device KIT     ferrous sulfate (IRON SUPPLEMENT) 325 (65 Fe) MG tablet     ibuprofen (ADVIL/MOTRIN) 600 MG tablet     INSULIN ADMIN SUPPLIES     norethindrone (MICRONOR) 0.35 MG tablet     No current facility-administered medications for this visit.       No Known Allergies    Social History     Tobacco Use     Smoking status: Never Smoker     Smokeless tobacco: Never Used   Substance Use Topics     Alcohol use: No     Drug use: No     No family history on file.     ROS: A complete 10 point ROS was conducted and was negative aside from that noted in the HPI    O: /68   Pulse 87   Ht 1.676 m (5' 6\")   Wt 128.8 kg (284 lb)   LMP 2018 (Approximate)   BMI 45.84 kg/m     Gen: sitting up, NAD  Cardiac: RRR, no m/r/g  Pulm: CTAB throughout posterior lung fields  Abd: soft, obese, nontender  Pelvic: Normal external genitalia and hair distribution. Vaginal mucosa pink and well rugated. Moderate blood in vault, no clots in vault. Cervix visually closed, minimal active bleeding from os.    A/P: 38 year old  with recent CS scar ectopic pregnancy, resolved, now with AUB, thickened endometrial stripe with possible polyp. Discussed options of endometrial biopsy with repeat " imaging in  1mo or hysteroscopy D&C with polypectomy. Recommend OR option due to continued heavy bleeding, she is nervous given hemorrhage and UAE s/p hysteroscopy for CS scar ectopic but thinks this is the best option. Did discuss placement of Mirena IUD at time of hysteroscopy D&C which would treat AUB if not caused by polyp, but she confirmed her previous desire to not have Mirena IUD  - Ferrous sulfate sent to pharmacy here  - Provera taper - 10mg TID x3d, 10mg BID x3d, 10mg daily for 3d. Counseled will have bleeding again when these pills are done  - CBC now. Resulted prior to writing this note and 8.9, similar to 9.3 of 2 weeks ago. Vitals are reassuring at this time so I do not think sending to ED for acute bleeding is necessary    Patient staffed with Dr Gil Kumar MD PGY3  03/07/19     The Patient was seen in [facility] Clinic by Dr. Kumar.   I reviewed the history & exam. Assessment and plan were jointly made.   Luzma Cordero MD, MPH

## 2019-03-08 ENCOUNTER — TELEPHONE (OUTPATIENT)
Dept: OBGYN | Facility: CLINIC | Age: 39
End: 2019-03-08

## 2019-03-08 ASSESSMENT — ANXIETY QUESTIONNAIRES: GAD7 TOTAL SCORE: 6

## 2019-03-29 ENCOUNTER — HOSPITAL ENCOUNTER (OUTPATIENT)
Facility: CLINIC | Age: 39
Discharge: HOME | End: 2019-03-29
Attending: OBSTETRICS & GYNECOLOGY | Admitting: OBSTETRICS & GYNECOLOGY
Payer: COMMERCIAL

## 2019-03-29 ENCOUNTER — ANESTHESIA (OUTPATIENT)
Dept: SURGERY | Facility: CLINIC | Age: 39
End: 2019-03-29
Payer: COMMERCIAL

## 2019-03-29 ENCOUNTER — ANESTHESIA EVENT (OUTPATIENT)
Dept: SURGERY | Facility: CLINIC | Age: 39
End: 2019-03-29
Payer: COMMERCIAL

## 2019-03-29 ENCOUNTER — INTERPRETER (OUTPATIENT)
Dept: INTERPRETER SERVICES | Facility: CLINIC | Age: 39
End: 2019-03-29
Payer: COMMERCIAL

## 2019-03-29 VITALS
TEMPERATURE: 97.8 F | WEIGHT: 276.46 LBS | HEART RATE: 72 BPM | HEIGHT: 67 IN | DIASTOLIC BLOOD PRESSURE: 85 MMHG | BODY MASS INDEX: 43.39 KG/M2 | SYSTOLIC BLOOD PRESSURE: 134 MMHG | RESPIRATION RATE: 16 BRPM | OXYGEN SATURATION: 100 %

## 2019-03-29 DIAGNOSIS — N93.9 ABNORMAL UTERINE BLEEDING: ICD-10-CM

## 2019-03-29 DIAGNOSIS — Z98.890 S/P DILATION AND CURETTAGE: Primary | ICD-10-CM

## 2019-03-29 LAB
ABO + RH BLD: NORMAL
ABO + RH BLD: NORMAL
B-HCG SERPL-ACNC: <1 IU/L (ref 0–5)
BLD GP AB SCN SERPL QL: NORMAL
BLOOD BANK CMNT PATIENT-IMP: NORMAL
CREAT SERPL-MCNC: 0.54 MG/DL (ref 0.52–1.04)
ERYTHROCYTE [DISTWIDTH] IN BLOOD BY AUTOMATED COUNT: 15.5 % (ref 10–15)
GFR SERPL CREATININE-BSD FRML MDRD: >90 ML/MIN/{1.73_M2}
GLUCOSE BLDC GLUCOMTR-MCNC: 250 MG/DL (ref 70–99)
GLUCOSE SERPL-MCNC: 289 MG/DL (ref 70–99)
HCT VFR BLD AUTO: 28.3 % (ref 35–47)
HGB BLD-MCNC: 7.8 G/DL (ref 11.7–15.7)
MCH RBC QN AUTO: 18.7 PG (ref 26.5–33)
MCHC RBC AUTO-ENTMCNC: 27.6 G/DL (ref 31.5–36.5)
MCV RBC AUTO: 68 FL (ref 78–100)
PLATELET # BLD AUTO: 278 10E9/L (ref 150–450)
POTASSIUM SERPL-SCNC: 4.2 MMOL/L (ref 3.4–5.3)
RBC # BLD AUTO: 4.18 10E12/L (ref 3.8–5.2)
SPECIMEN EXP DATE BLD: NORMAL
WBC # BLD AUTO: 4.9 10E9/L (ref 4–11)

## 2019-03-29 PROCEDURE — T1013 SIGN LANG/ORAL INTERPRETER: HCPCS | Mod: U3

## 2019-03-29 PROCEDURE — 86900 BLOOD TYPING SEROLOGIC ABO: CPT | Performed by: OBSTETRICS & GYNECOLOGY

## 2019-03-29 PROCEDURE — 25800030 ZZH RX IP 258 OP 636: Performed by: NURSE ANESTHETIST, CERTIFIED REGISTERED

## 2019-03-29 PROCEDURE — 27210794 ZZH OR GENERAL SUPPLY STERILE: Performed by: OBSTETRICS & GYNECOLOGY

## 2019-03-29 PROCEDURE — 25000128 H RX IP 250 OP 636

## 2019-03-29 PROCEDURE — 36000059 ZZH SURGERY LEVEL 3 EA 15 ADDTL MIN UMMC: Performed by: OBSTETRICS & GYNECOLOGY

## 2019-03-29 PROCEDURE — 25000125 ZZHC RX 250: Performed by: NURSE ANESTHETIST, CERTIFIED REGISTERED

## 2019-03-29 PROCEDURE — 25000132 ZZH RX MED GY IP 250 OP 250 PS 637: Performed by: OBSTETRICS & GYNECOLOGY

## 2019-03-29 PROCEDURE — 82565 ASSAY OF CREATININE: CPT | Performed by: OBSTETRICS & GYNECOLOGY

## 2019-03-29 PROCEDURE — 40000170 ZZH STATISTIC PRE-PROCEDURE ASSESSMENT II: Performed by: OBSTETRICS & GYNECOLOGY

## 2019-03-29 PROCEDURE — 37000008 ZZH ANESTHESIA TECHNICAL FEE, 1ST 30 MIN: Performed by: OBSTETRICS & GYNECOLOGY

## 2019-03-29 PROCEDURE — 82962 GLUCOSE BLOOD TEST: CPT

## 2019-03-29 PROCEDURE — 71000027 ZZH RECOVERY PHASE 2 EACH 15 MINS: Performed by: OBSTETRICS & GYNECOLOGY

## 2019-03-29 PROCEDURE — 25000128 H RX IP 250 OP 636: Performed by: NURSE ANESTHETIST, CERTIFIED REGISTERED

## 2019-03-29 PROCEDURE — 86901 BLOOD TYPING SEROLOGIC RH(D): CPT | Performed by: OBSTETRICS & GYNECOLOGY

## 2019-03-29 PROCEDURE — 85027 COMPLETE CBC AUTOMATED: CPT | Performed by: OBSTETRICS & GYNECOLOGY

## 2019-03-29 PROCEDURE — 86850 RBC ANTIBODY SCREEN: CPT | Performed by: OBSTETRICS & GYNECOLOGY

## 2019-03-29 PROCEDURE — 84132 ASSAY OF SERUM POTASSIUM: CPT | Performed by: OBSTETRICS & GYNECOLOGY

## 2019-03-29 PROCEDURE — 84702 CHORIONIC GONADOTROPIN TEST: CPT | Performed by: OBSTETRICS & GYNECOLOGY

## 2019-03-29 PROCEDURE — 88305 TISSUE EXAM BY PATHOLOGIST: CPT | Mod: 26 | Performed by: OBSTETRICS & GYNECOLOGY

## 2019-03-29 PROCEDURE — 82947 ASSAY GLUCOSE BLOOD QUANT: CPT | Mod: XU | Performed by: OBSTETRICS & GYNECOLOGY

## 2019-03-29 PROCEDURE — 25000125 ZZHC RX 250

## 2019-03-29 PROCEDURE — 36415 COLL VENOUS BLD VENIPUNCTURE: CPT | Performed by: OBSTETRICS & GYNECOLOGY

## 2019-03-29 PROCEDURE — 36000057 ZZH SURGERY LEVEL 3 1ST 30 MIN - UMMC: Performed by: OBSTETRICS & GYNECOLOGY

## 2019-03-29 PROCEDURE — 37000009 ZZH ANESTHESIA TECHNICAL FEE, EACH ADDTL 15 MIN: Performed by: OBSTETRICS & GYNECOLOGY

## 2019-03-29 PROCEDURE — 88305 TISSUE EXAM BY PATHOLOGIST: CPT | Performed by: OBSTETRICS & GYNECOLOGY

## 2019-03-29 RX ORDER — ONDANSETRON 4 MG/1
4 TABLET, ORALLY DISINTEGRATING ORAL
Status: DISCONTINUED | OUTPATIENT
Start: 2019-03-29 | End: 2019-03-29 | Stop reason: HOSPADM

## 2019-03-29 RX ORDER — LIDOCAINE HYDROCHLORIDE 20 MG/ML
INJECTION, SOLUTION INFILTRATION; PERINEURAL PRN
Status: DISCONTINUED | OUTPATIENT
Start: 2019-03-29 | End: 2019-03-29

## 2019-03-29 RX ORDER — ACETAMINOPHEN 325 MG/1
975 TABLET ORAL ONCE
Status: DISCONTINUED | OUTPATIENT
Start: 2019-03-29 | End: 2019-03-29 | Stop reason: HOSPADM

## 2019-03-29 RX ORDER — KETOROLAC TROMETHAMINE 30 MG/ML
INJECTION, SOLUTION INTRAMUSCULAR; INTRAVENOUS PRN
Status: DISCONTINUED | OUTPATIENT
Start: 2019-03-29 | End: 2019-03-29

## 2019-03-29 RX ORDER — ONDANSETRON 2 MG/ML
INJECTION INTRAMUSCULAR; INTRAVENOUS PRN
Status: DISCONTINUED | OUTPATIENT
Start: 2019-03-29 | End: 2019-03-29

## 2019-03-29 RX ORDER — METOPROLOL TARTRATE 1 MG/ML
1-2 INJECTION, SOLUTION INTRAVENOUS EVERY 5 MIN PRN
Status: DISCONTINUED | OUTPATIENT
Start: 2019-03-29 | End: 2019-03-29 | Stop reason: HOSPADM

## 2019-03-29 RX ORDER — ALBUTEROL SULFATE 0.83 MG/ML
2.5 SOLUTION RESPIRATORY (INHALATION) EVERY 4 HOURS PRN
Status: DISCONTINUED | OUTPATIENT
Start: 2019-03-29 | End: 2019-03-29 | Stop reason: HOSPADM

## 2019-03-29 RX ORDER — HYDRALAZINE HYDROCHLORIDE 20 MG/ML
2.5-5 INJECTION INTRAMUSCULAR; INTRAVENOUS EVERY 10 MIN PRN
Status: DISCONTINUED | OUTPATIENT
Start: 2019-03-29 | End: 2019-03-29 | Stop reason: HOSPADM

## 2019-03-29 RX ORDER — SODIUM CHLORIDE, SODIUM LACTATE, POTASSIUM CHLORIDE, CALCIUM CHLORIDE 600; 310; 30; 20 MG/100ML; MG/100ML; MG/100ML; MG/100ML
INJECTION, SOLUTION INTRAVENOUS CONTINUOUS PRN
Status: DISCONTINUED | OUTPATIENT
Start: 2019-03-29 | End: 2019-03-29

## 2019-03-29 RX ORDER — MEPERIDINE HYDROCHLORIDE 25 MG/ML
12.5 INJECTION INTRAMUSCULAR; INTRAVENOUS; SUBCUTANEOUS
Status: DISCONTINUED | OUTPATIENT
Start: 2019-03-29 | End: 2019-03-29 | Stop reason: HOSPADM

## 2019-03-29 RX ORDER — ONDANSETRON 4 MG/1
4 TABLET, ORALLY DISINTEGRATING ORAL EVERY 30 MIN PRN
Status: DISCONTINUED | OUTPATIENT
Start: 2019-03-29 | End: 2019-03-29 | Stop reason: HOSPADM

## 2019-03-29 RX ORDER — OXYCODONE HYDROCHLORIDE 5 MG/1
10 TABLET ORAL EVERY 4 HOURS PRN
Status: DISCONTINUED | OUTPATIENT
Start: 2019-03-29 | End: 2019-03-29 | Stop reason: HOSPADM

## 2019-03-29 RX ORDER — PHYSOSTIGMINE SALICYLATE 1 MG/ML
1.2 INJECTION INTRAVENOUS
Status: DISCONTINUED | OUTPATIENT
Start: 2019-03-29 | End: 2019-03-29 | Stop reason: HOSPADM

## 2019-03-29 RX ORDER — ONDANSETRON 2 MG/ML
4 INJECTION INTRAMUSCULAR; INTRAVENOUS EVERY 30 MIN PRN
Status: DISCONTINUED | OUTPATIENT
Start: 2019-03-29 | End: 2019-03-29 | Stop reason: HOSPADM

## 2019-03-29 RX ORDER — NALOXONE HYDROCHLORIDE 0.4 MG/ML
.1-.4 INJECTION, SOLUTION INTRAMUSCULAR; INTRAVENOUS; SUBCUTANEOUS
Status: DISCONTINUED | OUTPATIENT
Start: 2019-03-29 | End: 2019-03-29 | Stop reason: HOSPADM

## 2019-03-29 RX ORDER — DEXAMETHASONE SODIUM PHOSPHATE 4 MG/ML
4 INJECTION, SOLUTION INTRA-ARTICULAR; INTRALESIONAL; INTRAMUSCULAR; INTRAVENOUS; SOFT TISSUE EVERY 10 MIN PRN
Status: DISCONTINUED | OUTPATIENT
Start: 2019-03-29 | End: 2019-03-29 | Stop reason: HOSPADM

## 2019-03-29 RX ORDER — PROPOFOL 10 MG/ML
INJECTION, EMULSION INTRAVENOUS PRN
Status: DISCONTINUED | OUTPATIENT
Start: 2019-03-29 | End: 2019-03-29

## 2019-03-29 RX ORDER — SODIUM CHLORIDE, SODIUM LACTATE, POTASSIUM CHLORIDE, CALCIUM CHLORIDE 600; 310; 30; 20 MG/100ML; MG/100ML; MG/100ML; MG/100ML
INJECTION, SOLUTION INTRAVENOUS CONTINUOUS
Status: DISCONTINUED | OUTPATIENT
Start: 2019-03-29 | End: 2019-03-29 | Stop reason: HOSPADM

## 2019-03-29 RX ORDER — ACETAMINOPHEN 325 MG/1
975 TABLET ORAL ONCE
Status: COMPLETED | OUTPATIENT
Start: 2019-03-29 | End: 2019-03-29

## 2019-03-29 RX ORDER — FENTANYL CITRATE 50 UG/ML
25-50 INJECTION, SOLUTION INTRAMUSCULAR; INTRAVENOUS
Status: DISCONTINUED | OUTPATIENT
Start: 2019-03-29 | End: 2019-03-29 | Stop reason: HOSPADM

## 2019-03-29 RX ORDER — LIDOCAINE 40 MG/G
CREAM TOPICAL
Status: DISCONTINUED | OUTPATIENT
Start: 2019-03-29 | End: 2019-03-29 | Stop reason: HOSPADM

## 2019-03-29 RX ORDER — FENTANYL CITRATE 50 UG/ML
INJECTION, SOLUTION INTRAMUSCULAR; INTRAVENOUS PRN
Status: DISCONTINUED | OUTPATIENT
Start: 2019-03-29 | End: 2019-03-29

## 2019-03-29 RX ORDER — PROPOFOL 10 MG/ML
INJECTION, EMULSION INTRAVENOUS CONTINUOUS PRN
Status: DISCONTINUED | OUTPATIENT
Start: 2019-03-29 | End: 2019-03-29

## 2019-03-29 RX ORDER — HYDROMORPHONE HYDROCHLORIDE 1 MG/ML
.3-.5 INJECTION, SOLUTION INTRAMUSCULAR; INTRAVENOUS; SUBCUTANEOUS EVERY 10 MIN PRN
Status: DISCONTINUED | OUTPATIENT
Start: 2019-03-29 | End: 2019-03-29 | Stop reason: HOSPADM

## 2019-03-29 RX ORDER — IBUPROFEN 600 MG/1
600 TABLET, FILM COATED ORAL EVERY 6 HOURS PRN
Qty: 30 TABLET | Refills: 0 | Status: SHIPPED | OUTPATIENT
Start: 2019-03-29 | End: 2019-04-26

## 2019-03-29 RX ADMIN — KETOROLAC TROMETHAMINE 30 MG: 30 INJECTION, SOLUTION INTRAMUSCULAR at 08:45

## 2019-03-29 RX ADMIN — FENTANYL CITRATE 25 MCG: 50 INJECTION, SOLUTION INTRAMUSCULAR; INTRAVENOUS at 08:10

## 2019-03-29 RX ADMIN — PROPOFOL 50 MG: 10 INJECTION, EMULSION INTRAVENOUS at 08:06

## 2019-03-29 RX ADMIN — LIDOCAINE HYDROCHLORIDE 100 MG: 20 INJECTION, SOLUTION INFILTRATION; PERINEURAL at 08:06

## 2019-03-29 RX ADMIN — FENTANYL CITRATE 25 MCG: 50 INJECTION, SOLUTION INTRAMUSCULAR; INTRAVENOUS at 08:16

## 2019-03-29 RX ADMIN — MIDAZOLAM 2 MG: 1 INJECTION INTRAMUSCULAR; INTRAVENOUS at 08:02

## 2019-03-29 RX ADMIN — ACETAMINOPHEN 975 MG: 325 TABLET, FILM COATED ORAL at 06:17

## 2019-03-29 RX ADMIN — ONDANSETRON 4 MG: 2 INJECTION INTRAMUSCULAR; INTRAVENOUS at 08:33

## 2019-03-29 RX ADMIN — SODIUM CHLORIDE, POTASSIUM CHLORIDE, SODIUM LACTATE AND CALCIUM CHLORIDE: 600; 310; 30; 20 INJECTION, SOLUTION INTRAVENOUS at 08:00

## 2019-03-29 RX ADMIN — PROPOFOL 50 MCG/KG/MIN: 10 INJECTION, EMULSION INTRAVENOUS at 08:06

## 2019-03-29 RX ADMIN — HYDROMORPHONE HYDROCHLORIDE 0.25 MG: 1 INJECTION, SOLUTION INTRAMUSCULAR; INTRAVENOUS; SUBCUTANEOUS at 08:39

## 2019-03-29 ASSESSMENT — MIFFLIN-ST. JEOR: SCORE: 1961.63

## 2019-03-29 NOTE — OP NOTE
[facility]  Full Operative Note  Joann Gann  [id number]  1980  Procedure Date: 19  Procedure:  Hysteroscopy, dilation and curettage     Preoperative diagnosis:  Abnormal uterine bleeding, suspected polyp  Postoperative diagnosis:  Abnormal uterine bleeding, likely non-structural etiology     Surgeon:  Lisa Costello MD  Assistant: Elizabeth Johnson, PGY-1; Abraham Gerber, MS3     Anesthesia:  MAC, local with 1% lidocaine with vasopressin     Specimens:  Endometrial curettings  Complications: None apparent  EBL:  10 cc  IVF:  900 cc crystalloid   UOP:  200 cc clear urine  Fluid deficit:  170 cc     Findings:    EUA: normal appearing external genitalia, vaginal canal, and cervix  Hysteroscopy: Normal appearing endometrial cavity - bilateral ostia visualized, normal appearing endometrium posteriorly, attachment site of prior  scar pregnancy appears to be well healed.  No evidence of polypoid tissue or myoma.     Indications:  Joann Gann, 39yo , is here for above procedure for [id number]. TVUS on 2019 showed thickened heterogenous endometrial strip at 19.19mm with possible polyp. She had a  scar ectopic pregnancy at the end of 2018 for which she received 4 doses of methotrexate and ultimately underwent hysteroscopic resection complicated by hemorrhage that required UAE.      Indications, alternatives, benefits, and risks including bleeding, infection, injury to surrounding organs such as bowel, bladder, blood vessels, ureters, nerves, and uterus were reviewed.  Questions and concerns were addressed.  Consent was signed.  The patient stated that she would consent for a blood transfusion if medically necessary.  We discussed in depth the patient's goals for the procedure.     Procedure:  The patient was taken to the operating room where she underwent MAC anesthesia without difficulty.  She was placed in a dorsal lithotomy position  using Frankie stirrups.  The patient was examined for the above noted findings and then prepped and draped in the usual sterile fashion. Her bladder was drained. A safety timeout was performed.     A medium graves speculum was inserted into the vagina.  A single tooth tenaculum was placed vertically at 12 o'clock.  A total of 20 cc of 1% lidocaine with vasopressin was then injected at 4 o'clock and 8 o'clock positions of the cervicovaginal junction. The cervical os was carefully dilated to 19F with sequential Headley dilators. The hysteroscope was placed through the cervix into the uterine cavity. Examination of the uterine cavity demonstrated normal appearing endometrial cavity without tissue consistent with polyp or myoma. The remainder of the cavity was unremarkable. Without a hysteroscopically removable lesion noted in the uterine cavity, the hysteroscope apparatus was removed and total of 170 mL fluid deficit of normal saline noted.     Sharp curettage was performed until uterine surfaces were gritty on all aspects. The curetings were sent to pathology. The tenaculum was removed from the cervix and good hemostasis was noted after applying pressure with ring forceps. The speculum was removed from the vagina.The patient was repositioned to the supine position.  The patient tolerated the procedure well and was taken to the recovery room in stable condition.  All sponge, lap, and needle counts were correct x2. Dr. Costello was scrubbed and present for the entire procedure.    Elizabeth Johnson MD  N OBGYN PGY-1  3/29/2019    I participated in all aspects of Joann Gann's case with Dr. Johnson on 3/29/2019 and agree with the operative details in this note with edits by me.     Lisa Costello MD MPH

## 2019-03-29 NOTE — OR NURSING
Talked with Dr. Lowry notified of blood glucose of 250, Pt has not taken home oral diabetes medications since yesterday. If family has oral medications OK to take now, otherwise take when she gets home.

## 2019-03-29 NOTE — DISCHARGE INSTRUCTIONS
Same-Day Surgery   Adult Discharge Orders & Instructions     For 24 hours after surgery:  1. Get plenty of rest.  A responsible adult must stay with you for at least 24 hours after you leave the hospital.   2. Pain medication can slow your reflexes. Do not drive or use heavy equipment.  If you have weakness or tingling, don't drive or use heavy equipment until this feeling goes away.  3. Mixing alcohol and pain medication can cause dizziness and slow your breathing. It can even be fatal. Do not drink alcohol while taking pain medication.  4. Avoid strenuous or risky activities.  Ask for help when climbing stairs.   5. You may feel lightheaded.  If so, sit for a few minutes before standing.  Have someone help you get up.   6. If you have nausea (feel sick to your stomach), drink only clear liquids such as apple juice, ginger ale, broth or 7-Up.  Rest may also help.  Be sure to drink enough fluids.  Move to a regular diet as you feel able. Take pain medications with a small amount of solid food, such as toast or crackers, to avoid nausea.   7. A slight fever is normal. Call the doctor if your fever is over 100 F (37.7 C) (taken under the tongue) or lasts longer than 24 hours.  8. You may have a dry mouth, muscle aches, trouble sleeping or a sore throat.  These symptoms should go away after 24 hours.  9. Do not make important or legal decisions.   Pain Management:      1. Take pain medication (if prescribed) for pain as directed by your physician.        2. WARNING: If the pain medication you have been prescribed contains Tylenol  (acetaminophen), DO NOT take additional doses of Tylenol (acetaminophen).     Call your doctor for any of the followin.  Signs of infection (fever, growing tenderness at the surgery site, severe pain, a large amount of drainage or bleeding, foul-smelling drainage, redness, swelling).    2.  It has been over 8 to 10 hours since surgery and you are still not able to urinate (pee).    3.   Headache for over 24 hours.    4.  Numbness, tingling or weakness the day after surgery (if you had spinal anesthesia).  To contact a doctor, call _____Dr. Costello__________________________ or:      [phone] and ask for the Resident On Call for:          _PUMA__________________________________ (answered 24 hours a day)      Emergency Department:  [place] [facility]: [phone]  [place] [facility]: [phone]               Rev. 10/2014

## 2019-03-29 NOTE — ANESTHESIA CARE TRANSFER NOTE
Patient: Joann Gann    Procedure(s):  COMBINED DILATION AND CURETTAGE, OPERATIVE HYSTEROSCOPY    Diagnosis: Menorrhagia  Diagnosis Additional Information: No value filed.    Anesthesia Type:   No value filed.     Note:  Airway :Face Mask  Patient transferred to:Phase II  Comments: Report to Stacey. RNs   Pt sleepy  125/84  71  RR 12, BBS are clear, diminished in bases   HR 70  HOB up.  Denies discomfort  Temp 36.4  Dr Velazquez present for check inHandoff Report: Identifed the Patient, Identified the Reponsible Provider, Reviewed the pertinent medical history, Discussed the surgical course, Reviewed Intra-OP anesthesia mangement and issues during anesthesia, Set expectations for post-procedure period and Allowed opportunity for questions and acknowledgement of understanding      Vitals: (Last set prior to Anesthesia Care Transfer)    CRNA VITALS  3/29/2019 0822 - 3/29/2019 0900      3/29/2019             NIBP:  134/84    Pulse:  70                Electronically Signed By: OLY Schilling CRNA  March 29, 2019  9:00 AM

## 2019-03-29 NOTE — ANESTHESIA PREPROCEDURE EVALUATION
Anesthesia Pre-Procedure Evaluation    Patient: Joann Gann   MRN:     [id number] Gender:   female   Age:    38 year old :      1980        Preoperative Diagnosis: Menorrhagia   Procedure(s):  COMBINED DILATION AND CURETTAGE, OPERATIVE HYSTEROSCOPY     Past Medical History:   Diagnosis Date     Diabetes (H)     Type 2      Past Surgical History:   Procedure Laterality Date      SECTION  , ,      DILATION AND CURETTAGE, OPERATIVE HYSTEROSCOPY WITH MORCELLATOR, COMBINED N/A 2018    Procedure: COMBINED DILATION AND CURETTAGE, OPERATIVE HYSTEROSCOPY WITH MORCELLATOR;  Operative Hysteroscopy;  Surgeon: Lisa Drake MD;  Location: [facility]     Uterine artery embolization Bilateral 2018    Emergent after partial resection of CSP          Anesthesia Evaluation     . Pt has had prior anesthetic. Type: Regional and MAC (Bleeding following C section scar ectopic pregnancy resection)    No history of anesthetic complications          ROS/MED HX    ENT/Pulmonary:  - neg pulmonary ROS     Neurologic:  - neg neurologic ROS     Cardiovascular:  - neg cardiovascular ROS       METS/Exercise Tolerance: Comment: Uterine artery embolization after ectopic C section scar resection    Hematologic:     (+) History of Transfusion no previous transfusion reaction -      Musculoskeletal:         GI/Hepatic:  - neg GI/hepatic ROS       Renal/Genitourinary:  - ROS Renal section negative       Endo:     (+) type II DM Not using insulin Obesity, .      Psychiatric:         Infectious Disease:  - neg infectious disease ROS       Malignancy:      - no malignancy   Other:    (+) No chance of pregnancy                        PHYSICAL EXAM:   Mental Status/Neuro: A/A/O   Airway: Facies: Feasible  Mallampati: I  Mouth/Opening: Full  TM distance: > 6 cm  Neck ROM: Full   Respiratory: Auscultation: CTAB     Resp. Rate: Normal     Resp. Effort: Normal      CV: Rhythm: Regular  Rate: Age  "appropriate  Heart: Normal Sounds   Comments:      Dental: Normal     Habitus: Obesity             Lab Results   Component Value Date    WBC 4.9 03/29/2019    HGB 7.8 (L) 03/29/2019    HCT 28.3 (L) 03/29/2019     03/29/2019     08/31/2018    POTASSIUM 4.2 03/29/2019    CHLORIDE 110 (H) 08/31/2018    CO2 21 08/31/2018    BUN 9 08/31/2018    CR 0.67 08/31/2018     (H) 08/31/2018    GAIL 8.9 08/31/2018    ALBUMIN 3.5 08/31/2018    PROTTOTAL 7.5 08/31/2018    ALT 44 08/31/2018    AST 33 08/31/2018    ALKPHOS 107 08/31/2018    BILITOTAL 0.4 08/31/2018    PTT 26 08/31/2018    INR 1.11 08/31/2018    FIBR Duplicate request 08/31/2018    FIBR 334 08/31/2018    TSH 2.73 02/22/2019       Preop Vitals  BP Readings from Last 3 Encounters:   03/29/19 144/89   03/07/19 123/68   02/22/19 139/87    Pulse Readings from Last 3 Encounters:   03/29/19 76   03/07/19 87   02/22/19 97      Resp Readings from Last 3 Encounters:   03/29/19 18   09/01/18 18   08/23/18 18    SpO2 Readings from Last 3 Encounters:   03/29/19 98%   09/01/18 98%   08/23/18 100%      Temp Readings from Last 1 Encounters:   03/29/19 36.9  C (98.4  F) (Oral)    Ht Readings from Last 1 Encounters:   03/29/19 1.702 m (5' 7\")      Wt Readings from Last 1 Encounters:   03/29/19 125.4 kg (276 lb 7.3 oz)    Estimated body mass index is 43.3 kg/m  as calculated from the following:    Height as of this encounter: 1.702 m (5' 7\").    Weight as of this encounter: 125.4 kg (276 lb 7.3 oz).     LDA:  Peripheral IV 03/29/19 Right;Dorsal Hand (Active)   Number of days: 0       Right Groin Interventional Procedure Access (Active)   Number of days: 210            Assessment:   ASA SCORE: 3    Will be NPO appropriate at: 3/29/2019 7:05 AM   Documentation: H&P complete; Preop Testing complete; Consents complete   Proceeding: Proceed without further delay  Tobacco Use:  NO Active use of Tobacco/UNKNOWN Tobacco use status     Plan:   Anes. Type:  MAC   Pre-Induction: " Midazolam IV   Induction:  IV (Standard)   Airway: Native Airway   Access/Monitoring: PIV   Maintenance: Propofol; IV   Emergence: Procedure Site   Logistics: Same Day Surgery     Postop Pain/Sedation Strategy:  Standard-Options: Opioids PRN     PONV Management:  Adult Risk Factors: Female, Non-Smoker, Postop Opioids  Prevention: Propofol Infusion; Ondansetron; Dexamethasone     CONSENT: Direct conversation   Plan and risks discussed with: Patient   Blood Products: Consented (ALL Blood Products)       Comments for Plan/Consent:  Type and screen due to bleeding history                         Erich Lowry MD

## 2019-03-29 NOTE — ANESTHESIA POSTPROCEDURE EVALUATION
Anesthesia POST Procedure Evaluation    Patient: Joann Gann   MRN:     [id number] Gender:   female   Age:    38 year old :      1980        Preoperative Diagnosis: Menorrhagia   Procedure(s):  COMBINED DILATION AND CURETTAGE, OPERATIVE HYSTEROSCOPY   Postop Comments: No value filed.       Anesthesia Type:  MAC    Reportable Event: NO     PAIN: Uncomplicated   Sign Out status: Comfortable, Well controlled pain     PONV: No PONV   Sign Out status:  No Nausea or Vomiting     Neuro/Psych: Uneventful perioperative course   Sign Out Status: Preoperative baseline; Age appropriate mentation     Airway/Resp.: Uneventful perioperative course        CV: Uneventful perioperative course   Sign Out status: Appropriate BP and perfusion indices; Appropriate HR/Rhythm     Disposition:   Sign Out in:  PACU  Disposition:  Phase II; Home  Recovery Course: Uneventful  Follow-Up: Not required           Last Anesthesia Record Vitals:  CRNA VITALS  3/29/2019 0822 - 3/29/2019 0919      3/29/2019             NIBP:  134/84    Pulse:  70          Last PACU/Preop Vitals:  Vitals:    19 0559 19 0856   BP: 144/89 134/84   Pulse: 76 75   Resp: 18 14   Temp: 36.9  C (98.4  F) 36.8  C (98.2  F)   SpO2: 98% 100%         Electronically Signed By: Erich Lowry MD, 2019, 9:19 AM

## 2019-03-29 NOTE — BRIEF OP NOTE
[facility]  Brief Operative Note  Joann Gann  [id number]  1980  Procedure Date: 19  Procedure:  Hysteroscopy, dilation and curettage    Preoperative diagnosis:  AUB-P  Postoperative diagnosis:  Same, s/p above procedure    Surgeon:  Lisa Drake MD  Assistant: Elizabeth Johnson, PGY-1; Abraham Gerber, MS3    Anesthesia:  MAC, local with 1% lidocaine with vasopressin    Specimens:  Endometrial curetting   Complications: None apparent  EBL:  10 cc  IVF:  900 cc crystalloid   UOP:  200cc clear urine  Fluid deficit:  170 cc    Findings:    EUA: normal appearing external genitalia, vaginal canal, and cervix  Hysteroscopy: Normal appearing endometrial cavity without notable polyp.  section scar was not obvious.     Indications:  Joann Gann, 39yo , is here for above procedure for AUB-P. TVUS on 2019 showed thickened heterogenous endometrial strip at 19.19mm with possible polyp. She had a  scar ectopic pregnancy at the end of 2018 for which she received 4 doses of methotrexate and ultimately underwent hysteroscopic resection complicated by hemorrhage that required UAE.     Indications, alternatives, benefits, and risks including bleeding, infection, injury to surrounding organs such as bowel, bladder, blood vessels, ureters, nerves, and uterus were reviewed.  Questions and concerns were addressed.  Consent was signed.  The patient stated that she would consent for a blood transfusion if medically necessary.  We discussed in depth the patient's goals for the procedure.     Elizabeth Johnson MD  N OBGYN PGY-1  3/29/2019

## 2019-04-02 LAB — COPATH REPORT: NORMAL

## 2019-04-26 ENCOUNTER — OFFICE VISIT (OUTPATIENT)
Dept: OBGYN | Facility: CLINIC | Age: 39
End: 2019-04-26
Payer: COMMERCIAL

## 2019-04-26 VITALS
HEART RATE: 101 BPM | WEIGHT: 277 LBS | DIASTOLIC BLOOD PRESSURE: 84 MMHG | SYSTOLIC BLOOD PRESSURE: 126 MMHG | BODY MASS INDEX: 43.38 KG/M2

## 2019-04-26 DIAGNOSIS — N93.9 ABNORMAL UTERINE BLEEDING (AUB): Primary | ICD-10-CM

## 2019-04-26 LAB
ERYTHROCYTE [DISTWIDTH] IN BLOOD BY AUTOMATED COUNT: 16.6 % (ref 10–15)
HCT VFR BLD AUTO: 28.3 % (ref 35–47)
HGB BLD-MCNC: 7.9 G/DL (ref 11.7–15.7)
MCH RBC QN AUTO: 18 PG (ref 26.5–33)
MCHC RBC AUTO-ENTMCNC: 27.9 G/DL (ref 31.5–36.5)
MCV RBC AUTO: 65 FL (ref 78–100)
PLATELET # BLD AUTO: 300 10E9/L (ref 150–450)
RBC # BLD AUTO: 4.38 10E12/L (ref 3.8–5.2)
WBC # BLD AUTO: 7 10E9/L (ref 4–11)

## 2019-04-26 PROCEDURE — 87591 N.GONORRHOEAE DNA AMP PROB: CPT | Performed by: OBSTETRICS & GYNECOLOGY

## 2019-04-26 PROCEDURE — 36415 COLL VENOUS BLD VENIPUNCTURE: CPT | Performed by: OBSTETRICS & GYNECOLOGY

## 2019-04-26 PROCEDURE — G0463 HOSPITAL OUTPT CLINIC VISIT: HCPCS | Mod: ZF

## 2019-04-26 PROCEDURE — 87491 CHLMYD TRACH DNA AMP PROBE: CPT | Performed by: OBSTETRICS & GYNECOLOGY

## 2019-04-26 PROCEDURE — 85027 COMPLETE CBC AUTOMATED: CPT | Performed by: OBSTETRICS & GYNECOLOGY

## 2019-04-26 RX ORDER — DOXYCYCLINE 100 MG/1
100 CAPSULE ORAL 2 TIMES DAILY
Qty: 28 CAPSULE | Refills: 0 | Status: SHIPPED | OUTPATIENT
Start: 2019-04-26

## 2019-04-26 ASSESSMENT — PAIN SCALES - GENERAL: PAINLEVEL: NO PAIN (0)

## 2019-04-26 NOTE — PATIENT INSTRUCTIONS
You were seen for post-op appointment from dilation and curettage procedure.  Please go to the lab to have a hemoglobin level drawn.  You will be called with results from your testing today.  Given findings of chronic endometritis and abnormal bleeding, I recommend course of antibiotics as this may be the cause of your bleeding.  -  doxycycline at the pharmacy  - take 1 pill twice daily for 14 days  ________________________________________  Usted fue visto para la hermilo después de la operación de la dilatación y el procedimiento de curetaje.  Por favor, vaya al laboratorio para extraer un nivel de hemoglobina.  Se le llamará con los resultados de norma pruebas de hoy.  Dados los hallazgos de endometritis crónica y sangrado anormal, recomiendo curso de antibióticos ya que esta puede ser la causa de taylor sangrado.  - doxiciclina en la farmacia  -michaela 1 píldora dos veces al día fausto 14 días

## 2019-04-26 NOTE — NURSING NOTE
Chief Complaint   Patient presents with     Surgical Followup     D&C 3/29/2019   Princess Jensen LPN

## 2019-04-26 NOTE — PROGRESS NOTES
S Clinic Post-op Note    39yo s/p hysteroscopy D&C for AUB-suspected polyp on 3/29/19 - no polyp found intra-op.    S: Feeling ok, using Monistat for a yeast infection. Normal period 4/16-17 lasting 1 week, first 3 days heavy. She is feeling very tired now, gets palpitations and shortness of breat when going up stairs. Denies lightheadedness.    O: /84   Pulse 101   Wt 125.6 kg (277 lb)   LMP 04/16/2019   Breastfeeding? No   BMI 43.38 kg/m       Gen: sitting up, NAD  Abd: soft, nontender  Pelvic: declined    Path:  FINAL DIAGNOSIS:   Endometrium, curettage:   - Chronic endometritis, in a background of proliferative endometrium with   extensive stromal breakdown   - Negative for atypia or malignancy    A/P: Ms Homar Gann is a 39yo  s/p hysteroscopy D&C for AUB, with findings of chronic endometritis. Reviewed pathology, this is lower likelihood reason for havy menses and would recommend suppression of bleeding to recover from anemia with either IUD, hormonal birth control, or in the least TXA. Will treat for endometritis regardless in case contributing  - GC/CT urine (pt declined pelvic) in case this is causal organism  - doxycycline 100mg BID x14d  - f/u appt in 3-4 wks, discussed that if continued heavy bleeding recommend hormonal or nonhormonal mgmt of AUB. Did briefly discuss risk of blood clots with TXA    Anemia:  - Hgb 7.8 3/29  - CBC now, will call if <7, discussed would recommend transfusion in this case Resulted and called patient, Hgb 7.9, ok to continue iron and f/u in 3-4 weeks following treatment to f/u on AUB    D/w Dr Pravin Kumar MD PGY3  4/26/19    Patient was seen by the resident in [facility].  I reviewed the history & exam.  The patient's assessment and plan were made jointly.    Lisa Costello MD MPH

## 2019-04-28 LAB
C TRACH DNA SPEC QL NAA+PROBE: NEGATIVE
N GONORRHOEA DNA SPEC QL NAA+PROBE: NEGATIVE
SPECIMEN SOURCE: NORMAL
SPECIMEN SOURCE: NORMAL

## 2019-04-29 ENCOUNTER — TELEPHONE (OUTPATIENT)
Dept: OBGYN | Facility: CLINIC | Age: 39
End: 2019-04-29

## 2019-04-29 NOTE — TELEPHONE ENCOUNTER
Spoke to patient regarding results of recent testing. Patient states she started the prescribed doxycycline 4/26 and noted vaginal pain, as well as dizziness and sleepiness. Patient states she discontinued doxycycline last evening.     Note sent to provider re: side effects of medication.

## 2021-10-22 NOTE — OR NURSING
PACU to Inpatient Nursing Handoff    Patient Joann Gann is a 38 year old female who speaks Macedonian.   Procedure Procedure(s):  Out of Or   Surgeon(s) Primary: GENERIC ANESTHESIA PROVIDER     No Known Allergies    Isolation  No active isolations     Past Medical History   has a past medical history of Diabetes (H).    Anesthesia General   Dermatome Level     Preop Meds acetaminophen (Tylenol) - time given: 0925   Nerve block Not applicable   Intraop Meds dexamethasone (Decadron)  fentanyl (Sublimaze): 50 mcg total  hydromorphone (Dilaudid): 0 mg total  ondansetron (Zofran): last given at 1221   Local Meds No   Antibiotics cefazolin (Ancef) - last given at 1213  Doxycycline - last given at 0800     Pain Patient Currently in Pain: yes  Comfort: tolerable with discomfort  Pain Control: partially effective   PACU meds  fentanyl (Sublimaze): 50 mcg (total dose) last given at 1408   hydromorphone (Dilaudid): 0.6 mg (total dose) last given at 1534    PCA / epidural No   Capnography     Telemetry ECG Rhythm: Normal sinus rhythm   Inpatient Telemetry Monitor Ordered? No        Labs Glucose Lab Results   Component Value Date     08/31/2018       Hgb Lab Results   Component Value Date    HGB 10.1 08/31/2018       INR Lab Results   Component Value Date    INR 1.04 08/31/2018      PACU Imaging Not applicable     Wound/Incision     CMS Peripheral Neurovascular WDL: WDL (08/31/18 1328)  All Extremities Temperature: warm (08/31/18 1328)  All Extremities Color: no discoloration (08/31/18 1328)   Equipment Not applicable   Other LDA Right Groin Interventional Procedure Access (Active)   Site Assessment [facility] 8/31/2018  2:30 PM   CMS Right Extremity WDL 8/31/2018  2:30 PM   Dorsalis Pulse - Right Leg Normal 8/31/2018  2:30 PM   Number of days:0        IV Access Peripheral IV 08/31/18 Right Hand (Active)   Site Assessment [facility] 8/31/2018  2:30 PM   Line Status Infusing 8/31/2018  2:30 PM   Phlebitis Scale 0-->no symptoms  8/31/2018  2:30 PM   Infiltration Scale 0 8/31/2018  2:30 PM   Number of days:0       Peripheral IV 08/31/18 Left Hand (Active)   Site Assessment [facility] 8/31/2018  2:30 PM   Line Status Saline locked 8/31/2018  2:30 PM   Phlebitis Scale 0-->no symptoms 8/31/2018  2:30 PM   Infiltration Scale 0 8/31/2018  2:30 PM   Number of days:0       Right Groin Interventional Procedure Access (Active)   Site Assessment [facility] 8/31/2018  2:30 PM   CMS Right Extremity [facility] 8/31/2018  2:30 PM   Dorsalis Pulse - Right Leg Normal 8/31/2018  2:30 PM   Number of days:0      Blood Products Not applicable EBL ?   mL   Intake/Output Date 08/31/18 0700 - 09/01/18 0659   Shift 5175-4491 9626-0985 4007-0550 24 Hour Total   I  N  T  A  K  E   I.V. 2400   2400    Shift Total  (mL/kg) 2400  (18.07)   2400  (18.07)   O  U  T  P  U  T   Blood 75   75    Shift Total  (mL/kg) 75  (0.56)   75  (0.56)   Weight (kg) 132.8 132.8 132.8 132.8        Drains / Ko Right Groin Interventional Procedure Access (Active)   Site Assessment [facility] 8/31/2018  2:30 PM   CMS Right Extremity [facility] 8/31/2018  2:30 PM   Dorsalis Pulse - Right Leg Normal 8/31/2018  2:30 PM   Number of days:0      Time of void PreOp Void Prior to Procedure: 0630 (08/31/18 0632)    PostOp      Diapered? No   Bladder Scan     PO    none offered     Vitals    B/P: 110/69  T: 97.5  F (36.4  C)    Temp src: Axillary  P:  Pulse: 106 (08/31/18 0600)    Heart Rate: 92 (08/31/18 1445)     R: 18  O2:  SpO2: 99 %    O2 Device: Nasal cannula (08/31/18 1400)    Oxygen Delivery: 2 LPM (08/31/18 1400)         Family/support present significant other and  brother and sister in law   Patient belongings Patient Belongings: clothing;shoes  Disposition of Belongings: Locker   Patient transported on cart and air mat   DC meds/scripts (obs/outpt) Yes, meds   Inpatient Pain Meds Released? No       Special needs/considerations needs to lay flat til 1615   Tasks needing completion straight cathed for 550, Kinyarwanda  speaking, intepreter here til 1630       Luzma Tom, RN  ASCOM [id number]   General

## 2025-05-23 NOTE — NURSING NOTE
Chief Complaint   Patient presents with     RECHECK     C/O irregular bleeding x 2 months   Princess Jensen LPN   Water and goldfish provided.

## (undated) DEVICE — PAD CHUX UNDERPAD 30X36" P3036C

## (undated) DEVICE — LINEN GOWN X4 5410

## (undated) DEVICE — TUBING SYS AQUILEX BLUE INFLOW AQL-110 YLW OUTFLOW AQL-111

## (undated) DEVICE — PAD PERI INDIV WRAP 11" 2022A

## (undated) DEVICE — SUCTION CANISTER BEMIS HI FLOW 006772-901

## (undated) DEVICE — STRAP KNEE/BODY 31143004

## (undated) DEVICE — GLOVE PROTEXIS BLUE W/NEU-THERA 7.0  2D73EB70

## (undated) DEVICE — SU VICRYL 2-0 CT-1 27" UND J259H

## (undated) DEVICE — GLOVE PROTEXIS W/NEU-THERA 6.5  2D73TE65

## (undated) DEVICE — SOL WATER IRRIG 1000ML BOTTLE 2F7114

## (undated) DEVICE — DECANTER TRANSFER DEVICE 2008S

## (undated) DEVICE — Device

## (undated) DEVICE — SOL NACL 0.9% IRRIG 3000ML BAG 2B7477

## (undated) DEVICE — SEAL SET MYOSURE ROD LENS SCOPE SINGLE USE 40-902

## (undated) DEVICE — GOWN XLG DISP 9545

## (undated) DEVICE — SOL NACL 0.9% IRRIG 1000ML BOTTLE 2F7124

## (undated) DEVICE — SPECIMEN BAG BEMIS HI FLOW SUCTION WHITE SOCK 533810

## (undated) DEVICE — SUCTION VACUUM CANISTER STANDARD W/LID&CAPS 003987-901

## (undated) DEVICE — LINEN TOWEL PACK X5 5464

## (undated) RX ORDER — ONDANSETRON 2 MG/ML
INJECTION INTRAMUSCULAR; INTRAVENOUS
Status: DISPENSED
Start: 2019-03-29

## (undated) RX ORDER — DEXAMETHASONE SODIUM PHOSPHATE 4 MG/ML
INJECTION, SOLUTION INTRA-ARTICULAR; INTRALESIONAL; INTRAMUSCULAR; INTRAVENOUS; SOFT TISSUE
Status: DISPENSED
Start: 2019-03-29

## (undated) RX ORDER — FENTANYL CITRATE 50 UG/ML
INJECTION, SOLUTION INTRAMUSCULAR; INTRAVENOUS
Status: DISPENSED
Start: 2018-08-31

## (undated) RX ORDER — LIDOCAINE HYDROCHLORIDE 20 MG/ML
INJECTION, SOLUTION EPIDURAL; INFILTRATION; INTRACAUDAL; PERINEURAL
Status: DISPENSED
Start: 2019-03-29

## (undated) RX ORDER — CEFAZOLIN SODIUM 1 G/3ML
INJECTION, POWDER, FOR SOLUTION INTRAMUSCULAR; INTRAVENOUS
Status: DISPENSED
Start: 2018-08-31

## (undated) RX ORDER — LIDOCAINE HYDROCHLORIDE 10 MG/ML
INJECTION, SOLUTION INFILTRATION; PERINEURAL
Status: DISPENSED
Start: 2018-08-10

## (undated) RX ORDER — LIDOCAINE HYDROCHLORIDE 10 MG/ML
INJECTION, SOLUTION EPIDURAL; INFILTRATION; INTRACAUDAL; PERINEURAL
Status: DISPENSED
Start: 2018-08-31

## (undated) RX ORDER — PROPOFOL 10 MG/ML
INJECTION, EMULSION INTRAVENOUS
Status: DISPENSED
Start: 2018-08-31

## (undated) RX ORDER — HYDROMORPHONE HYDROCHLORIDE 1 MG/ML
INJECTION, SOLUTION INTRAMUSCULAR; INTRAVENOUS; SUBCUTANEOUS
Status: DISPENSED
Start: 2018-08-31

## (undated) RX ORDER — HYDROMORPHONE HYDROCHLORIDE 1 MG/ML
INJECTION, SOLUTION INTRAMUSCULAR; INTRAVENOUS; SUBCUTANEOUS
Status: DISPENSED
Start: 2019-03-29

## (undated) RX ORDER — LIDOCAINE HYDROCHLORIDE 10 MG/ML
INJECTION, SOLUTION EPIDURAL; INFILTRATION; INTRACAUDAL; PERINEURAL
Status: DISPENSED
Start: 2019-03-29

## (undated) RX ORDER — PROPOFOL 10 MG/ML
INJECTION, EMULSION INTRAVENOUS
Status: DISPENSED
Start: 2018-08-10

## (undated) RX ORDER — PHENYLEPHRINE HCL IN 0.9% NACL 1 MG/10 ML
SYRINGE (ML) INTRAVENOUS
Status: DISPENSED
Start: 2018-08-31

## (undated) RX ORDER — SODIUM CHLORIDE, SODIUM LACTATE, POTASSIUM CHLORIDE, CALCIUM CHLORIDE 600; 310; 30; 20 MG/100ML; MG/100ML; MG/100ML; MG/100ML
INJECTION, SOLUTION INTRAVENOUS
Status: DISPENSED
Start: 2019-03-29

## (undated) RX ORDER — HEPARIN SODIUM 1000 [USP'U]/ML
INJECTION, SOLUTION INTRAVENOUS; SUBCUTANEOUS
Status: DISPENSED
Start: 2018-08-31

## (undated) RX ORDER — LIDOCAINE HYDROCHLORIDE 20 MG/ML
INJECTION, SOLUTION EPIDURAL; INFILTRATION; INTRACAUDAL; PERINEURAL
Status: DISPENSED
Start: 2018-08-31

## (undated) RX ORDER — ACETAMINOPHEN 325 MG/1
TABLET ORAL
Status: DISPENSED
Start: 2018-08-31

## (undated) RX ORDER — FENTANYL CITRATE 50 UG/ML
INJECTION, SOLUTION INTRAMUSCULAR; INTRAVENOUS
Status: DISPENSED
Start: 2019-03-29

## (undated) RX ORDER — DEXAMETHASONE SODIUM PHOSPHATE 4 MG/ML
INJECTION, SOLUTION INTRA-ARTICULAR; INTRALESIONAL; INTRAMUSCULAR; INTRAVENOUS; SOFT TISSUE
Status: DISPENSED
Start: 2018-08-31

## (undated) RX ORDER — VASOPRESSIN 20 U/ML
INJECTION PARENTERAL
Status: DISPENSED
Start: 2018-08-31

## (undated) RX ORDER — ONDANSETRON 2 MG/ML
INJECTION INTRAMUSCULAR; INTRAVENOUS
Status: DISPENSED
Start: 2018-08-31

## (undated) RX ORDER — VASOPRESSIN 20 U/ML
INJECTION PARENTERAL
Status: DISPENSED
Start: 2019-03-29

## (undated) RX ORDER — ACETAMINOPHEN 325 MG/1
TABLET ORAL
Status: DISPENSED
Start: 2019-03-29

## (undated) RX ORDER — LIDOCAINE HYDROCHLORIDE 20 MG/ML
INJECTION, SOLUTION EPIDURAL; INFILTRATION; INTRACAUDAL; PERINEURAL
Status: DISPENSED
Start: 2018-08-10